# Patient Record
Sex: FEMALE | Race: WHITE | NOT HISPANIC OR LATINO | Employment: UNEMPLOYED | URBAN - METROPOLITAN AREA
[De-identification: names, ages, dates, MRNs, and addresses within clinical notes are randomized per-mention and may not be internally consistent; named-entity substitution may affect disease eponyms.]

---

## 2017-10-04 ENCOUNTER — CONVERSION ENCOUNTER (OUTPATIENT)
Dept: GENERAL RADIOLOGY | Facility: HOSPITAL | Age: 57
End: 2017-10-04

## 2018-03-08 ENCOUNTER — OFFICE VISIT CONVERTED (OUTPATIENT)
Dept: GASTROENTEROLOGY | Facility: CLINIC | Age: 58
End: 2018-03-08
Attending: PHYSICIAN ASSISTANT

## 2018-08-08 ENCOUNTER — HOSPITAL ENCOUNTER (EMERGENCY)
Facility: HOSPITAL | Age: 58
Discharge: HOME | End: 2018-08-08
Attending: EMERGENCY MEDICINE | Admitting: EMERGENCY MEDICINE

## 2018-08-08 VITALS
HEIGHT: 63 IN | TEMPERATURE: 98.4 F | RESPIRATION RATE: 16 BRPM | SYSTOLIC BLOOD PRESSURE: 109 MMHG | WEIGHT: 150 LBS | OXYGEN SATURATION: 92 % | DIASTOLIC BLOOD PRESSURE: 52 MMHG | HEART RATE: 66 BPM | BODY MASS INDEX: 26.58 KG/M2

## 2018-08-08 DIAGNOSIS — J18.9 COMMUNITY ACQUIRED PNEUMONIA, UNSPECIFIED LATERALITY: Primary | ICD-10-CM

## 2018-08-08 LAB
ALBUMIN SERPL-MCNC: 3.5 G/DL (ref 3.5–5.2)
ALBUMIN/GLOB SERPL: 1.2 G/DL
ALP SERPL-CCNC: 93 U/L (ref 39–117)
ALT SERPL W P-5'-P-CCNC: 13 U/L (ref 1–33)
ANION GAP SERPL CALCULATED.3IONS-SCNC: 9.5 MMOL/L
AST SERPL-CCNC: 11 U/L (ref 1–32)
BASOPHILS # BLD AUTO: 0.01 10*3/MM3 (ref 0–0.2)
BASOPHILS NFR BLD AUTO: 0.2 % (ref 0–1.5)
BILIRUB SERPL-MCNC: <0.2 MG/DL (ref 0.1–1.2)
BUN BLD-MCNC: 15 MG/DL (ref 8–23)
BUN/CREAT SERPL: 14.3 (ref 7–25)
CALCIUM SPEC-SCNC: 9.2 MG/DL (ref 8.6–10.5)
CHLORIDE SERPL-SCNC: 98 MMOL/L (ref 98–107)
CO2 SERPL-SCNC: 29.5 MMOL/L (ref 22–29)
CREAT BLD-MCNC: 1.05 MG/DL (ref 0.57–1)
D-LACTATE SERPL-SCNC: 0.8 MMOL/L (ref 0.5–2)
DEPRECATED RDW RBC AUTO: 40.2 FL (ref 37–54)
EOSINOPHIL # BLD AUTO: 0.14 10*3/MM3 (ref 0–0.7)
EOSINOPHIL NFR BLD AUTO: 2.3 % (ref 0.3–6.2)
ERYTHROCYTE [DISTWIDTH] IN BLOOD BY AUTOMATED COUNT: 13.4 % (ref 11.7–13)
GFR SERPL CREATININE-BSD FRML MDRD: 53 ML/MIN/1.73
GLOBULIN UR ELPH-MCNC: 2.9 GM/DL
GLUCOSE BLD-MCNC: 148 MG/DL (ref 65–99)
HCT VFR BLD AUTO: 38.3 % (ref 35.6–45.5)
HGB BLD-MCNC: 12.2 G/DL (ref 11.9–15.5)
IMM GRANULOCYTES # BLD: 0.01 10*3/MM3 (ref 0–0.03)
IMM GRANULOCYTES NFR BLD: 0.2 % (ref 0–0.5)
LYMPHOCYTES # BLD AUTO: 0.53 10*3/MM3 (ref 0.9–4.8)
LYMPHOCYTES NFR BLD AUTO: 8.5 % (ref 19.6–45.3)
MCH RBC QN AUTO: 26.1 PG (ref 26.9–32)
MCHC RBC AUTO-ENTMCNC: 31.9 G/DL (ref 32.4–36.3)
MCV RBC AUTO: 81.8 FL (ref 80.5–98.2)
MONOCYTES # BLD AUTO: 0.43 10*3/MM3 (ref 0.2–1.2)
MONOCYTES NFR BLD AUTO: 6.9 % (ref 5–12)
NEUTROPHILS # BLD AUTO: 5.1 10*3/MM3 (ref 1.9–8.1)
NEUTROPHILS NFR BLD AUTO: 82.1 % (ref 42.7–76)
NT-PROBNP SERPL-MCNC: 126.6 PG/ML (ref 0–900)
PLATELET # BLD AUTO: 340 10*3/MM3 (ref 140–500)
PMV BLD AUTO: 9.2 FL (ref 6–12)
POTASSIUM BLD-SCNC: 4.3 MMOL/L (ref 3.5–5.2)
PROT SERPL-MCNC: 6.4 G/DL (ref 6–8.5)
RBC # BLD AUTO: 4.68 10*6/MM3 (ref 3.9–5.2)
SODIUM BLD-SCNC: 137 MMOL/L (ref 136–145)
TROPONIN T SERPL-MCNC: <0.01 NG/ML (ref 0–0.03)
WBC NRBC COR # BLD: 6.21 10*3/MM3 (ref 4.5–10.7)

## 2018-08-08 PROCEDURE — 83605 ASSAY OF LACTIC ACID: CPT | Performed by: EMERGENCY MEDICINE

## 2018-08-08 PROCEDURE — 94640 AIRWAY INHALATION TREATMENT: CPT

## 2018-08-08 PROCEDURE — 80053 COMPREHEN METABOLIC PANEL: CPT | Performed by: EMERGENCY MEDICINE

## 2018-08-08 PROCEDURE — 84484 ASSAY OF TROPONIN QUANT: CPT | Performed by: EMERGENCY MEDICINE

## 2018-08-08 PROCEDURE — 93005 ELECTROCARDIOGRAM TRACING: CPT | Performed by: EMERGENCY MEDICINE

## 2018-08-08 PROCEDURE — 83880 ASSAY OF NATRIURETIC PEPTIDE: CPT | Performed by: EMERGENCY MEDICINE

## 2018-08-08 PROCEDURE — 99284 EMERGENCY DEPT VISIT MOD MDM: CPT

## 2018-08-08 PROCEDURE — 93010 ELECTROCARDIOGRAM REPORT: CPT | Performed by: INTERNAL MEDICINE

## 2018-08-08 PROCEDURE — 94799 UNLISTED PULMONARY SVC/PX: CPT

## 2018-08-08 PROCEDURE — 85025 COMPLETE CBC W/AUTO DIFF WBC: CPT | Performed by: EMERGENCY MEDICINE

## 2018-08-08 PROCEDURE — 36415 COLL VENOUS BLD VENIPUNCTURE: CPT

## 2018-08-08 PROCEDURE — 87040 BLOOD CULTURE FOR BACTERIA: CPT | Performed by: EMERGENCY MEDICINE

## 2018-08-08 RX ORDER — OXYCODONE HYDROCHLORIDE AND ACETAMINOPHEN 5; 325 MG/1; MG/1
2 TABLET ORAL ONCE
Status: COMPLETED | OUTPATIENT
Start: 2018-08-08 | End: 2018-08-08

## 2018-08-08 RX ORDER — OXYCODONE HYDROCHLORIDE AND ACETAMINOPHEN 5; 325 MG/1; MG/1
1-2 TABLET ORAL EVERY 6 HOURS PRN
Qty: 15 TABLET | Refills: 0 | Status: SHIPPED | OUTPATIENT
Start: 2018-08-08 | End: 2022-04-01

## 2018-08-08 RX ORDER — SODIUM CHLORIDE 0.9 % (FLUSH) 0.9 %
10 SYRINGE (ML) INJECTION AS NEEDED
Status: DISCONTINUED | OUTPATIENT
Start: 2018-08-08 | End: 2018-08-09 | Stop reason: HOSPADM

## 2018-08-08 RX ORDER — ESCITALOPRAM OXALATE 10 MG/1
10 TABLET ORAL DAILY
COMMUNITY
End: 2022-02-25

## 2018-08-08 RX ORDER — LEVOTHYROXINE SODIUM 88 UG/1
88 TABLET ORAL DAILY
COMMUNITY
End: 2022-02-25

## 2018-08-08 RX ORDER — ALBUTEROL SULFATE 90 UG/1
2 AEROSOL, METERED RESPIRATORY (INHALATION) EVERY 4 HOURS PRN
Qty: 1 INHALER | Refills: 0 | Status: SHIPPED | OUTPATIENT
Start: 2018-08-08 | End: 2022-04-01

## 2018-08-08 RX ORDER — PROMETHAZINE HYDROCHLORIDE 25 MG/1
25 TABLET ORAL EVERY 6 HOURS PRN
Qty: 10 TABLET | Refills: 0 | Status: SHIPPED | OUTPATIENT
Start: 2018-08-08 | End: 2022-04-01

## 2018-08-08 RX ORDER — IPRATROPIUM BROMIDE AND ALBUTEROL SULFATE 2.5; .5 MG/3ML; MG/3ML
3 SOLUTION RESPIRATORY (INHALATION) ONCE
Status: COMPLETED | OUTPATIENT
Start: 2018-08-08 | End: 2018-08-08

## 2018-08-08 RX ORDER — HYDROCODONE BITARTRATE AND ACETAMINOPHEN 5; 325 MG/1; MG/1
1 TABLET ORAL EVERY 6 HOURS PRN
COMMUNITY
End: 2018-08-08

## 2018-08-08 RX ADMIN — OXYCODONE HYDROCHLORIDE AND ACETAMINOPHEN 2 TABLET: 5; 325 TABLET ORAL at 21:59

## 2018-08-08 RX ADMIN — IPRATROPIUM BROMIDE AND ALBUTEROL SULFATE 3 ML: .5; 3 SOLUTION RESPIRATORY (INHALATION) at 22:01

## 2018-08-08 NOTE — ED PROVIDER NOTES
" EMERGENCY DEPARTMENT ENCOUNTER    CHIEF COMPLAINT  Chief Complaint: chest pain  History given by: pt  History limited by: none  Room Number:   PMD: Baltazar Davis MD      HPI:  Pt is a 58 y.o. female who presents complaining of \"sharp\" intermittent right sided chest pain that began 5 days ago. Pt denies any trauma to the chest. Pt reports the pain is mild at rest but becomes moderate while breathing. Pt admits to fever (max 102), SOA on exertion, and RUQ pain. Pt denies history of blood clots or heart problems. Pt was seen at PCP and was diagnosed with pneumonia (CT chest showed no PE, +PNA). Pt also had an elevated D- Dimer and was told to come to ER due to worry for CHF. Pt was told to come to the ER. Pt was put on antibiotics yesterday and the pt has taken two doses.    Duration/Onset/Timin days  Location: right sided chest  Radiation: none  Quality: \"sharp\"  Intensity/Severity: mild at rest  Associated Symptoms: SOA, RUQ pain, fever (max 102)  Aggravating or Alleviating Factors: Worsened while breathing  Previous Episodes: none      PAST MEDICAL HISTORY  Active Ambulatory Problems     Diagnosis Date Noted   • No Active Ambulatory Problems     Resolved Ambulatory Problems     Diagnosis Date Noted   • No Resolved Ambulatory Problems     Past Medical History:   Diagnosis Date   • Hypothyroidism        PAST SURGICAL HISTORY  History reviewed. No pertinent surgical history.    FAMILY HISTORY  History reviewed. No pertinent family history.    SOCIAL HISTORY  Social History     Social History   • Marital status:      Spouse name: N/A   • Number of children: N/A   • Years of education: N/A     Occupational History   • Not on file.     Social History Main Topics   • Smoking status: Never Smoker   • Smokeless tobacco: Not on file   • Alcohol use No   • Drug use: Unknown   • Sexual activity: Not on file     Other Topics Concern   • Not on file     Social History Narrative   • No narrative on file "       ALLERGIES  Latex    REVIEW OF SYSTEMS  Review of Systems   Constitutional: Positive for fever (max 102). Negative for chills.   HENT: Negative.  Negative for sore throat.    Eyes: Negative.    Respiratory: Positive for shortness of breath (on exertion). Negative for cough.    Cardiovascular: Positive for chest pain (right sided). Negative for leg swelling.   Gastrointestinal: Positive for abdominal pain (RUQ). Negative for diarrhea and vomiting.   Genitourinary: Negative.  Negative for dysuria.   Musculoskeletal: Negative.  Negative for back pain.   Skin: Negative.  Negative for rash.   Neurological: Negative.  Negative for headaches.   All other systems reviewed and are negative.      PHYSICAL EXAM  ED Triage Vitals [08/08/18 1844]   Temp Heart Rate Resp BP SpO2   100.2 °F (37.9 °C) 105 18 -- 94 %      Temp src Heart Rate Source Patient Position BP Location FiO2 (%)   -- -- -- -- --       Physical Exam   Constitutional: No distress.   HENT:   Head: Normocephalic and atraumatic.   Mouth/Throat: Oropharynx is clear and moist.   Eyes:   Unremarkable   Cardiovascular: Normal rate and regular rhythm.    Pulmonary/Chest: No respiratory distress. She has decreased breath sounds in the right lower field. She has rales in the right lower field.   Oxygen at 94%   Abdominal: There is no tenderness.   Musculoskeletal: She exhibits no edema or tenderness.   Neurological: She is alert.   Skin: No rash noted.   Nursing note and vitals reviewed.      LAB RESULTS  Lab Results (last 24 hours)     Procedure Component Value Units Date/Time    CBC & Differential [[id number]] Collected:  08/08/18 1951    Specimen:  Blood Updated:  08/08/18 2004    Narrative:       The following orders were created for panel order CBC & Differential.  Procedure                               Abnormality         Status                     ---------                               -----------         ------                     CBC Auto  Differential[[id number]]        Abnormal            Final result                 Please view results for these tests on the individual orders.    Comprehensive Metabolic Panel [[id number]]  (Abnormal) Collected:  08/08/18 1951    Specimen:  Blood Updated:  08/08/18 2030     Glucose 148 (H) mg/dL      BUN 15 mg/dL      Creatinine 1.05 (H) mg/dL      Sodium 137 mmol/L      Potassium 4.3 mmol/L      Chloride 98 mmol/L      CO2 29.5 (H) mmol/L      Calcium 9.2 mg/dL      Total Protein 6.4 g/dL      Albumin 3.50 g/dL      ALT (SGPT) 13 U/L      AST (SGOT) 11 U/L      Alkaline Phosphatase 93 U/L      Total Bilirubin <0.2 mg/dL      eGFR Non African Amer 53 (L) mL/min/1.73      Globulin 2.9 gm/dL      A/G Ratio 1.2 g/dL      BUN/Creatinine Ratio 14.3     Anion Gap 9.5 mmol/L     BNP [[id number]]  (Normal) Collected:  08/08/18 1951    Specimen:  Blood Updated:  08/08/18 2024     proBNP 126.6 pg/mL     Narrative:       Among patients with dyspnea, NT-proBNP is highly sensitive for the detection of acute congestive heart failure. In addition NT-proBNP of <300 pg/ml effectively rules out acute congestive heart failure with 99% negative predictive value.    Troponin [[id number]]  (Normal) Collected:  08/08/18 1951    Specimen:  Blood Updated:  08/08/18 2026     Troponin T <0.010 ng/mL     Narrative:       Troponin T Reference Ranges:  Less than 0.03 ng/mL:    Negative for AMI  0.03 to 0.09 ng/mL:      Indeterminant for AMI  Greater than 0.09 ng/mL: Positive for AMI    Blood Culture - Blood, [[id number]] Collected:  08/08/18 1951    Specimen:  Blood from Arm, Right Updated:  08/08/18 2003    Lactic Acid, Plasma [[id number]]  (Normal) Collected:  08/08/18 1951    Specimen:  Blood Updated:  08/08/18 2016     Lactate 0.8 mmol/L     CBC Auto Differential [[id number]]  (Abnormal) Collected:  08/08/18 1951    Specimen:  Blood Updated:  08/08/18 2004     WBC 6.21 10*3/mm3      RBC 4.68 10*6/mm3      Hemoglobin 12.2 g/dL      Hematocrit 38.3 %       MCV 81.8 fL      MCH 26.1 (L) pg      MCHC 31.9 (L) g/dL      RDW 13.4 (H) %      RDW-SD 40.2 fl      MPV 9.2 fL      Platelets 340 10*3/mm3      Neutrophil % 82.1 (H) %      Lymphocyte % 8.5 (L) %      Monocyte % 6.9 %      Eosinophil % 2.3 %      Basophil % 0.2 %      Immature Grans % 0.2 %      Neutrophils, Absolute 5.10 10*3/mm3      Lymphocytes, Absolute 0.53 (L) 10*3/mm3      Monocytes, Absolute 0.43 10*3/mm3      Eosinophils, Absolute 0.14 10*3/mm3      Basophils, Absolute 0.01 10*3/mm3      Immature Grans, Absolute 0.01 10*3/mm3     Blood Culture - Blood, [[id number]] Collected:  08/08/18 1956    Specimen:  Blood from Arm, Left Updated:  08/08/18 2002          I ordered the above labs and reviewed the results      PROCEDURES  Procedures    EKG           EKG time: 1848  Rhythm/Rate: sinus 94  P waves and NC: normal  QRS, axis: indeterminate axis  ST and T waves: T wave inversion in III, flattened T waves laterally     Interpreted Contemporaneously by me, independently viewed  No prior to compare    PROGRESS AND CONSULTS        1845  Ordered EKG for further evaluation.    1909  Discussed option to admit the pt due to pneumonia. Pt understands and agrees with the plan. All questions have been answered.    1919  Ordered labs for further evaluation.    2113  Rechecked patient who is resting comfortably. Pt stats are 91% on room air at rest. Discussed all lab and test results. Discussed plan to admit the pt due to pneumonia and low oxygen level. Pt understands and agrees with the plan. All questions have been answered.    2119  Ordered a call from [facility].    2126  Discussed case with Dr Terrell, A  Reviewed history, exam, results and treatments.  Discussed concerns and plan of care. Dr Terrell recommends attempting to ambulate the pt and view what her oxygen stats do during ambulation.     2136  Nurse reports that during movement pt stats dropped to 88 but once she was back at rest the stats went back up to  92. Gave pt option to admit or go home. Pt decided to go home. Pt reports she was given Norco but the Norco made her very nauseated and didn't giver her any relief. Pt reports she is unable to sleep due to pain. Discussed plan to discharge the pt with pain medication to help her sleep. Also discussed that the pt should follow up with PCP in the next few days to have a repeat Chest XR done. Discussed that the pt should try to rest as much as possible and drink plenty of fluids. Pt understands and agrees with the plan. All questions have been answered.    2150  Ordered Duo-neb for SOA and percocet for pain.    2237  Rechecked patient who is resting and reports feeling much better after percocet. Discussed plan to discharge the pt with percocet for pain. Pt understands and agrees with the plan. All questions have been answered.      MEDICAL DECISION MAKING  Results were reviewed/discussed with the patient and they were also made aware of online access. Pt also made aware that some labs, such as cultures, will not be resulted during ER visit and follow up with PMD is necessary.     MDM  Number of Diagnoses or Management Options     Amount and/or Complexity of Data Reviewed  Clinical lab tests: reviewed and ordered (Troponin  <0.010)  Tests in the medicine section of CPT®: ordered and reviewed (Refer to the procedure section of the note for EKG results)  Discuss the patient with other providers: yes (Dr Terrell)           DIAGNOSIS  Final diagnoses:   Community acquired pneumonia, unspecified laterality       DISPOSITION  DISCHARGE    Patient discharged in stable condition.    Reviewed implications of results, diagnosis, meds, responsibility to follow up, warning signs and symptoms of possible worsening, potential complications and reasons to return to ER.    Patient/Family voiced understanding of above instructions.    Discussed plan for discharge, as there is no emergent indication for admission. Patient referred to  primary care provider for BP management due to today's BP. Pt/family is agreeable and understands need for follow up and repeat testing.  Pt is aware that discharge does not mean that nothing is wrong but it indicates no emergency is present that requires admission and they must continue care with follow-up as given below or physician of their choice.     FOLLOW-UP  No follow-up provider specified.       Medication List      Stop    HYDROcodone-acetaminophen 5-325 MG per tablet  Commonly known as:  NORCO                  Latest Documented Vital Signs:  As of 10:39 PM  BP- 123/69 HR- 66 Temp- 100.2 °F (37.9 °C) O2 sat- 99%    --  Documentation assistance provided by chaim Gutiérrez for Dr Nation.  Information recorded by the scribe was done at my direction and has been verified and validated by me.           Chitra Gutiérrez  08/08/18 3080       Richard Nation MD  08/08/18 3656

## 2018-08-08 NOTE — ED NOTES
Pt reports that she was seen in PCP in [place] yesterday and was told she had double pneumonia; then had CT done and said she was in CHF; c/o chest pain with breathing and a hard time lying down due to the pain.      Carmen Chan, SUNITA  08/08/18 4397

## 2018-08-13 LAB
BACTERIA SPEC AEROBE CULT: NORMAL
BACTERIA SPEC AEROBE CULT: NORMAL

## 2018-09-11 ENCOUNTER — OFFICE VISIT CONVERTED (OUTPATIENT)
Dept: PULMONOLOGY | Facility: CLINIC | Age: 58
End: 2018-09-11
Attending: INTERNAL MEDICINE

## 2018-09-17 ENCOUNTER — OFFICE VISIT CONVERTED (OUTPATIENT)
Dept: PULMONOLOGY | Facility: CLINIC | Age: 58
End: 2018-09-17
Attending: PHYSICIAN ASSISTANT

## 2018-09-25 ENCOUNTER — OFFICE VISIT CONVERTED (OUTPATIENT)
Dept: PULMONOLOGY | Facility: CLINIC | Age: 58
End: 2018-09-25
Attending: INTERNAL MEDICINE

## 2018-10-18 ENCOUNTER — OFFICE VISIT CONVERTED (OUTPATIENT)
Dept: PULMONOLOGY | Facility: CLINIC | Age: 58
End: 2018-10-18
Attending: PHYSICIAN ASSISTANT

## 2019-01-25 ENCOUNTER — HOSPITAL ENCOUNTER (OUTPATIENT)
Dept: LAB | Facility: HOSPITAL | Age: 59
Discharge: HOME | End: 2019-01-25
Attending: INTERNAL MEDICINE

## 2019-01-25 LAB
25(OH)D3 SERPL-MCNC: 29.9 NG/ML (ref 30–100)
ALBUMIN SERPL-MCNC: 4 G/DL (ref 3.5–5)
ALBUMIN/GLOB SERPL: 1.7 {RATIO} (ref 1.4–2.6)
ALP SERPL-CCNC: 84 U/L (ref 53–141)
ALT SERPL-CCNC: 21 U/L (ref 10–40)
ANION GAP SERPL CALC-SCNC: 14 MMOL/L (ref 8–19)
AST SERPL-CCNC: 22 U/L (ref 15–50)
BASOPHILS # BLD AUTO: 0.03 10*3/UL (ref 0–0.2)
BASOPHILS NFR BLD AUTO: 0.95 % (ref 0–3)
BILIRUB SERPL-MCNC: 0.43 MG/DL (ref 0.2–1.3)
BUN SERPL-MCNC: 18 MG/DL (ref 5–25)
BUN/CREAT SERPL: 20 {RATIO} (ref 6–20)
CALCIUM SERPL-MCNC: 9.3 MG/DL (ref 8.7–10.4)
CHLORIDE SERPL-SCNC: 101 MMOL/L (ref 99–111)
CHOLEST SERPL-MCNC: 247 MG/DL (ref 107–200)
CHOLEST/HDLC SERPL: 4.3 {RATIO} (ref 3–6)
CONV CO2: 28 MMOL/L (ref 22–32)
CONV TOTAL PROTEIN: 6.3 G/DL (ref 6.3–8.2)
CREAT UR-MCNC: 0.88 MG/DL (ref 0.5–0.9)
EOSINOPHIL # BLD AUTO: 0.01 10*3/UL (ref 0–0.7)
EOSINOPHIL # BLD AUTO: 0.51 % (ref 0–7)
ERYTHROCYTE [DISTWIDTH] IN BLOOD BY AUTOMATED COUNT: 12.6 % (ref 11.5–14.5)
GFR SERPLBLD BASED ON 1.73 SQ M-ARVRAT: >60 ML/MIN/{1.73_M2}
GLOBULIN UR ELPH-MCNC: 2.3 G/DL (ref 2–3.5)
GLUCOSE SERPL-MCNC: 96 MG/DL (ref 65–99)
HBA1C MFR BLD: 12.9 G/DL (ref 12–16)
HCT VFR BLD AUTO: 38.7 % (ref 37–47)
HDLC SERPL-MCNC: 58 MG/DL (ref 40–60)
LDLC SERPL CALC-MCNC: 177 MG/DL (ref 70–100)
LYMPHOCYTES # BLD AUTO: 0.71 10*3/UL (ref 1–5)
MCH RBC QN AUTO: 26.2 PG (ref 27–31)
MCHC RBC AUTO-ENTMCNC: 33.4 G/DL (ref 33–37)
MCV RBC AUTO: 78.5 FL (ref 81–99)
MONOCYTES # BLD AUTO: 0.2 10*3/UL (ref 0.2–1.2)
MONOCYTES NFR BLD AUTO: 7.6 % (ref 3–10)
NEUTROPHILS # BLD AUTO: 1.69 10*3/UL (ref 2–8)
NEUTROPHILS NFR BLD AUTO: 64 % (ref 30–85)
NRBC BLD AUTO-RTO: 0 % (ref 0–0.01)
OSMOLALITY SERPL CALC.SUM OF ELEC: 290 MOSM/KG (ref 273–304)
PLATELET # BLD AUTO: 192 10*3/UL (ref 130–400)
PMV BLD AUTO: 7.9 FL (ref 7.4–10.4)
POTASSIUM SERPL-SCNC: 4.4 MMOL/L (ref 3.5–5.3)
RBC # BLD AUTO: 4.93 10*6/UL (ref 4.2–5.4)
SODIUM SERPL-SCNC: 139 MMOL/L (ref 135–147)
TRIGL SERPL-MCNC: 62 MG/DL (ref 40–150)
TSH SERPL-ACNC: 0.4 M[IU]/L (ref 0.27–4.2)
VARIANT LYMPHS NFR BLD MANUAL: 27 % (ref 20–45)
VLDLC SERPL-MCNC: 12 MG/DL (ref 5–37)
WBC # BLD AUTO: 2.64 10*3/UL (ref 4.8–10.8)

## 2019-02-07 ENCOUNTER — HOSPITAL ENCOUNTER (OUTPATIENT)
Dept: LAB | Facility: HOSPITAL | Age: 59
Discharge: HOME | End: 2019-02-07
Attending: INTERNAL MEDICINE

## 2019-05-07 ENCOUNTER — HOSPITAL ENCOUNTER (OUTPATIENT)
Dept: LAB | Facility: HOSPITAL | Age: 59
Discharge: HOME | End: 2019-05-07
Attending: INTERNAL MEDICINE

## 2019-05-07 LAB
BASOPHILS # BLD AUTO: 0.02 10*3/UL (ref 0–0.2)
BASOPHILS NFR BLD AUTO: 0.6 % (ref 0–3)
CONV ABS IMM GRAN: 0 10*3/UL (ref 0–0.2)
CONV IMMATURE GRAN: 0 % (ref 0–1.8)
DEPRECATED RDW RBC AUTO: 47.7 FL (ref 36.4–46.3)
EOSINOPHIL # BLD AUTO: 0 % (ref 0–7)
EOSINOPHIL # BLD AUTO: 0 10*3/UL (ref 0–0.7)
ERYTHROCYTE [DISTWIDTH] IN BLOOD BY AUTOMATED COUNT: 15.6 % (ref 11.7–14.4)
FOLATE SERPL-MCNC: 11 NG/ML (ref 4.8–20)
HBA1C MFR BLD: 12.5 G/DL (ref 12–16)
HCT VFR BLD AUTO: 40.5 % (ref 37–47)
LYMPHOCYTES # BLD AUTO: 1.14 10*3/UL (ref 1–5)
MCH RBC QN AUTO: 25.5 PG (ref 27–31)
MCHC RBC AUTO-ENTMCNC: 30.9 G/DL (ref 33–37)
MCV RBC AUTO: 82.7 FL (ref 81–99)
MONOCYTES # BLD AUTO: 0.22 10*3/UL (ref 0.2–1.2)
MONOCYTES NFR BLD AUTO: 6.4 % (ref 3–10)
NEUTROPHILS # BLD AUTO: 2.04 10*3/UL (ref 2–8)
NEUTROPHILS NFR BLD AUTO: 59.7 % (ref 30–85)
NRBC CBCN: 0 % (ref 0–0.7)
PLATELET # BLD AUTO: 248 10*3/UL (ref 130–400)
PMV BLD AUTO: 10.7 FL (ref 9.4–12.3)
RBC # BLD AUTO: 4.9 10*6/UL (ref 4.2–5.4)
TSH SERPL-ACNC: 0.35 M[IU]/L (ref 0.27–4.2)
VARIANT LYMPHS NFR BLD MANUAL: 33.3 % (ref 20–45)
VIT B12 SERPL-MCNC: 708 PG/ML (ref 211–911)
WBC # BLD AUTO: 3.42 10*3/UL (ref 4.8–10.8)

## 2019-07-25 ENCOUNTER — HOSPITAL ENCOUNTER (OUTPATIENT)
Dept: OTHER | Facility: HOSPITAL | Age: 59
Discharge: HOME | End: 2019-07-25
Attending: INTERNAL MEDICINE

## 2019-07-25 LAB
ANION GAP SERPL CALC-SCNC: 20 MMOL/L (ref 8–19)
BASOPHILS # BLD AUTO: 0.02 10*3/UL (ref 0–0.2)
BASOPHILS NFR BLD AUTO: 0.5 % (ref 0–3)
BNP SERPL-MCNC: 674 PG/ML (ref 0–900)
BUN SERPL-MCNC: 22 MG/DL (ref 5–25)
BUN/CREAT SERPL: 15 {RATIO} (ref 6–20)
CALCIUM SERPL-MCNC: 9.2 MG/DL (ref 8.7–10.4)
CHLORIDE SERPL-SCNC: 100 MMOL/L (ref 99–111)
CONV ABS IMM GRAN: 0.01 10*3/UL (ref 0–0.2)
CONV CO2: 25 MMOL/L (ref 22–32)
CONV IMMATURE GRAN: 0.2 % (ref 0–1.8)
CREAT UR-MCNC: 1.51 MG/DL (ref 0.5–0.9)
D-LACTATE SERPL-SCNC: 0.8 MMOL/L (ref 0.7–2.1)
DEPRECATED RDW RBC AUTO: 40.4 FL (ref 36.4–46.3)
EOSINOPHIL # BLD AUTO: 0.5 10*3/UL (ref 0–0.7)
EOSINOPHIL # BLD AUTO: 11.6 % (ref 0–7)
ERYTHROCYTE [DISTWIDTH] IN BLOOD BY AUTOMATED COUNT: 13.7 % (ref 11.7–14.4)
GFR SERPLBLD BASED ON 1.73 SQ M-ARVRAT: 37 ML/MIN/{1.73_M2}
GLUCOSE SERPL-MCNC: 111 MG/DL (ref 65–99)
HBA1C MFR BLD: 12.6 G/DL (ref 12–16)
HCT VFR BLD AUTO: 39.4 % (ref 37–47)
LYMPHOCYTES # BLD AUTO: 0.63 10*3/UL (ref 1–5)
MCH RBC QN AUTO: 26.3 PG (ref 27–31)
MCHC RBC AUTO-ENTMCNC: 32 G/DL (ref 33–37)
MCV RBC AUTO: 82.1 FL (ref 81–99)
MONOCYTES # BLD AUTO: 0.23 10*3/UL (ref 0.2–1.2)
MONOCYTES NFR BLD AUTO: 5.3 % (ref 3–10)
NEUTROPHILS # BLD AUTO: 2.92 10*3/UL (ref 2–8)
NEUTROPHILS NFR BLD AUTO: 67.8 % (ref 30–85)
NRBC CBCN: 0 % (ref 0–0.7)
OSMOLALITY SERPL CALC.SUM OF ELEC: 294 MOSM/KG (ref 273–304)
PLATELET # BLD AUTO: 190 10*3/UL (ref 130–400)
PMV BLD AUTO: 10.2 FL (ref 9.4–12.3)
POTASSIUM SERPL-SCNC: 4.7 MMOL/L (ref 3.5–5.3)
PROCALCITONIN SERPL-MCNC: 0.1 NG/ML (ref 0–4)
RBC # BLD AUTO: 4.8 10*6/UL (ref 4.2–5.4)
SODIUM SERPL-SCNC: 140 MMOL/L (ref 135–147)
VARIANT LYMPHS NFR BLD MANUAL: 14.6 % (ref 20–45)
WBC # BLD AUTO: 4.31 10*3/UL (ref 4.8–10.8)

## 2019-07-26 ENCOUNTER — HOSPITAL ENCOUNTER (OUTPATIENT)
Dept: OTHER | Facility: HOSPITAL | Age: 59
Discharge: HOME | End: 2019-07-26
Attending: INTERNAL MEDICINE

## 2019-07-26 LAB
APPEARANCE UR: CLEAR
BILIRUB UR QL: NEGATIVE
COLOR UR: YELLOW
CONV COLLECTION SOURCE (UA): ABNORMAL
CONV UROBILINOGEN IN URINE BY AUTOMATED TEST STRIP: 0.2 {EHRLICHU}/DL (ref 0.1–1)
GLUCOSE UR QL: NEGATIVE MG/DL
HGB UR QL STRIP: ABNORMAL
KETONES UR QL STRIP: NEGATIVE MG/DL
LEUKOCYTE ESTERASE UR QL STRIP: NEGATIVE
M PNEUMO IGM SER QL: NEGATIVE
NITRITE UR QL STRIP: NEGATIVE
PH UR STRIP.AUTO: 5 [PH] (ref 5–8)
PROT UR QL: NEGATIVE MG/DL
RBC #/AREA URNS HPF: ABNORMAL /[HPF]
SP GR UR: 1.01 (ref 1–1.03)
WBC #/AREA URNS HPF: ABNORMAL /[HPF]

## 2019-07-27 LAB — B BURGDOR IGG+IGM SER-ACNC: <0.91 ISR (ref 0–0.9)

## 2019-07-29 LAB
AMPICILLIN SUSC ISLT: <=0.25
BACTERIA UR CULT: ABNORMAL
CEFOTAXIME SUSC ISLT: <=0.12
CEFTRIAXONE SUSC ISLT: <=0.12
IGE SERPL-ACNC: 32 K[IU]/ML (ref 0–24)
LEVOFLOXACIN SUSC ISLT: 1
PENICILLIN G SUSC ISLT: 0.12
TETRACYCLINE SUSC ISLT: >=16
VANCOMYCIN SUSC ISLT: 0.5

## 2019-07-30 ENCOUNTER — HOSPITAL ENCOUNTER (OUTPATIENT)
Dept: LAB | Facility: HOSPITAL | Age: 59
Discharge: HOME | End: 2019-07-30
Attending: INTERNAL MEDICINE

## 2019-07-30 LAB
ANION GAP SERPL CALC-SCNC: 18 MMOL/L (ref 8–19)
B MICROTI DNA BLD QL NAA+PROBE: NOT DETECTED
BUN SERPL-MCNC: 17 MG/DL (ref 5–25)
BUN/CREAT SERPL: 20 {RATIO} (ref 6–20)
CALCIUM SERPL-MCNC: 9 MG/DL (ref 8.7–10.4)
CHLORIDE SERPL-SCNC: 100 MMOL/L (ref 99–111)
CONV ANAPLASMA PHAGOCYTOPHILUM PCR: NOT DETECTED
CONV BABESIA SPECIES PCR: NOT DETECTED
CONV CO2: 27 MMOL/L (ref 22–32)
CONV EHRLICHIA EWINGII CANIS PCR: NOT DETECTED
CONV EHRLICHIA MURIS LIKE PCR: NOT DETECTED
CREAT UR-MCNC: 0.84 MG/DL (ref 0.5–0.9)
E CHAFFEENSIS DNA BLD QL NAA+PROBE: NOT DETECTED
GFR SERPLBLD BASED ON 1.73 SQ M-ARVRAT: >60 ML/MIN/{1.73_M2}
GLUCOSE SERPL-MCNC: 96 MG/DL (ref 65–99)
OSMOLALITY SERPL CALC.SUM OF ELEC: 293 MOSM/KG (ref 273–304)
POTASSIUM SERPL-SCNC: 4.1 MMOL/L (ref 3.5–5.3)
SODIUM SERPL-SCNC: 141 MMOL/L (ref 135–147)

## 2019-08-05 ENCOUNTER — HOSPITAL ENCOUNTER (OUTPATIENT)
Dept: GENERAL RADIOLOGY | Facility: HOSPITAL | Age: 59
Discharge: HOME | End: 2019-08-05
Attending: INTERNAL MEDICINE

## 2019-08-09 ENCOUNTER — OFFICE VISIT CONVERTED (OUTPATIENT)
Dept: PULMONOLOGY | Facility: CLINIC | Age: 59
End: 2019-08-09
Attending: PHYSICIAN ASSISTANT

## 2019-08-28 ENCOUNTER — OFFICE VISIT CONVERTED (OUTPATIENT)
Dept: ORTHOPEDIC SURGERY | Facility: CLINIC | Age: 59
End: 2019-08-28
Attending: ORTHOPAEDIC SURGERY

## 2019-09-18 ENCOUNTER — OFFICE VISIT CONVERTED (OUTPATIENT)
Dept: ORTHOPEDIC SURGERY | Facility: CLINIC | Age: 59
End: 2019-09-18
Attending: ORTHOPAEDIC SURGERY

## 2019-10-14 ENCOUNTER — OFFICE VISIT CONVERTED (OUTPATIENT)
Dept: ORTHOPEDIC SURGERY | Facility: CLINIC | Age: 59
End: 2019-10-14
Attending: ORTHOPAEDIC SURGERY

## 2019-11-05 ENCOUNTER — HOSPITAL ENCOUNTER (OUTPATIENT)
Dept: LAB | Facility: HOSPITAL | Age: 59
Discharge: HOME | End: 2019-11-05
Attending: INTERNAL MEDICINE

## 2019-11-05 LAB
25(OH)D3 SERPL-MCNC: 27.4 NG/ML (ref 30–100)
ALBUMIN SERPL-MCNC: 4 G/DL (ref 3.5–5)
ALBUMIN/GLOB SERPL: 1.4 {RATIO} (ref 1.4–2.6)
ALP SERPL-CCNC: 110 U/L (ref 43–160)
ALT SERPL-CCNC: 12 U/L (ref 10–40)
ANION GAP SERPL CALC-SCNC: 16 MMOL/L (ref 8–19)
AST SERPL-CCNC: 16 U/L (ref 15–50)
BASOPHILS # BLD AUTO: 0.02 10*3/UL (ref 0–0.2)
BASOPHILS NFR BLD AUTO: 0.4 % (ref 0–3)
BILIRUB SERPL-MCNC: 0.26 MG/DL (ref 0.2–1.3)
BUN SERPL-MCNC: 19 MG/DL (ref 5–25)
BUN/CREAT SERPL: 25 {RATIO} (ref 6–20)
CALCIUM SERPL-MCNC: 9.5 MG/DL (ref 8.7–10.4)
CHLORIDE SERPL-SCNC: 98 MMOL/L (ref 99–111)
CONV ABS IMM GRAN: 0.01 10*3/UL (ref 0–0.2)
CONV CO2: 27 MMOL/L (ref 22–32)
CONV IMMATURE GRAN: 0.2 % (ref 0–1.8)
CONV TOTAL PROTEIN: 6.8 G/DL (ref 6.3–8.2)
CREAT UR-MCNC: 0.75 MG/DL (ref 0.5–0.9)
DEPRECATED RDW RBC AUTO: 39.6 FL (ref 36.4–46.3)
EOSINOPHIL # BLD AUTO: 0.28 10*3/UL (ref 0–0.7)
EOSINOPHIL # BLD AUTO: 6 % (ref 0–7)
ERYTHROCYTE [DISTWIDTH] IN BLOOD BY AUTOMATED COUNT: 13.1 % (ref 11.7–14.4)
GFR SERPLBLD BASED ON 1.73 SQ M-ARVRAT: >60 ML/MIN/{1.73_M2}
GLOBULIN UR ELPH-MCNC: 2.8 G/DL (ref 2–3.5)
GLUCOSE SERPL-MCNC: 82 MG/DL (ref 65–99)
HCT VFR BLD AUTO: 41 % (ref 37–47)
HGB BLD-MCNC: 12.6 G/DL (ref 12–16)
LYMPHOCYTES # BLD AUTO: 0.9 10*3/UL (ref 1–5)
LYMPHOCYTES NFR BLD AUTO: 19.1 % (ref 20–45)
MCH RBC QN AUTO: 25.6 PG (ref 27–31)
MCHC RBC AUTO-ENTMCNC: 30.7 G/DL (ref 33–37)
MCV RBC AUTO: 83.2 FL (ref 81–99)
MONOCYTES # BLD AUTO: 0.28 10*3/UL (ref 0.2–1.2)
MONOCYTES NFR BLD AUTO: 6 % (ref 3–10)
NEUTROPHILS # BLD AUTO: 3.21 10*3/UL (ref 2–8)
NEUTROPHILS NFR BLD AUTO: 68.3 % (ref 30–85)
NRBC CBCN: 0 % (ref 0–0.7)
OSMOLALITY SERPL CALC.SUM OF ELEC: 285 MOSM/KG (ref 273–304)
PLATELET # BLD AUTO: 390 10*3/UL (ref 130–400)
PMV BLD AUTO: 10 FL (ref 9.4–12.3)
POTASSIUM SERPL-SCNC: 4.1 MMOL/L (ref 3.5–5.3)
RBC # BLD AUTO: 4.93 10*6/UL (ref 4.2–5.4)
SODIUM SERPL-SCNC: 137 MMOL/L (ref 135–147)
TSH SERPL-ACNC: 0.49 M[IU]/L (ref 0.27–4.2)
WBC # BLD AUTO: 4.7 10*3/UL (ref 4.8–10.8)

## 2019-11-07 LAB — IGE SERPL-ACNC: 33 K[IU]/ML (ref 0–24)

## 2019-11-11 ENCOUNTER — HOSPITAL ENCOUNTER (OUTPATIENT)
Dept: GENERAL RADIOLOGY | Facility: HOSPITAL | Age: 59
Discharge: HOME | End: 2019-11-11
Attending: PHYSICIAN ASSISTANT

## 2019-11-11 ENCOUNTER — OFFICE VISIT CONVERTED (OUTPATIENT)
Dept: PULMONOLOGY | Facility: CLINIC | Age: 59
End: 2019-11-11
Attending: PHYSICIAN ASSISTANT

## 2019-11-18 ENCOUNTER — HOSPITAL ENCOUNTER (OUTPATIENT)
Dept: GENERAL RADIOLOGY | Facility: HOSPITAL | Age: 59
Discharge: HOME | End: 2019-11-18
Attending: PHYSICIAN ASSISTANT

## 2019-11-26 ENCOUNTER — OFFICE VISIT CONVERTED (OUTPATIENT)
Dept: PULMONOLOGY | Facility: CLINIC | Age: 59
End: 2019-11-26
Attending: PHYSICIAN ASSISTANT

## 2019-12-11 ENCOUNTER — HOSPITAL ENCOUNTER (OUTPATIENT)
Dept: OTHER | Facility: HOSPITAL | Age: 59
Discharge: HOME | End: 2019-12-11
Attending: INTERNAL MEDICINE

## 2019-12-11 LAB
BASOPHILS # BLD AUTO: 0.02 10*3/UL (ref 0–0.2)
BASOPHILS NFR BLD AUTO: 0.5 % (ref 0–3)
CONV ABS IMM GRAN: 0.01 10*3/UL (ref 0–0.2)
CONV IMMATURE GRAN: 0.2 % (ref 0–1.8)
CRP SERPL-MCNC: 138.8 MG/L (ref 0–5)
D-LACTATE SERPL-SCNC: 0.8 MMOL/L (ref 0.7–2.1)
DEPRECATED RDW RBC AUTO: 38.5 FL (ref 36.4–46.3)
EOSINOPHIL # BLD AUTO: 0.29 10*3/UL (ref 0–0.7)
EOSINOPHIL # BLD AUTO: 7 % (ref 0–7)
ERYTHROCYTE [DISTWIDTH] IN BLOOD BY AUTOMATED COUNT: 13.1 % (ref 11.7–14.4)
ERYTHROCYTE [SEDIMENTATION RATE] IN BLOOD: 37 MM/H (ref 0–30)
HCT VFR BLD AUTO: 38.3 % (ref 37–47)
HGB BLD-MCNC: 12 G/DL (ref 12–16)
LYMPHOCYTES # BLD AUTO: 0.7 10*3/UL (ref 1–5)
LYMPHOCYTES NFR BLD AUTO: 17 % (ref 20–45)
MCH RBC QN AUTO: 25.5 PG (ref 27–31)
MCHC RBC AUTO-ENTMCNC: 31.3 G/DL (ref 33–37)
MCV RBC AUTO: 81.3 FL (ref 81–99)
MONOCYTES # BLD AUTO: 0.36 10*3/UL (ref 0.2–1.2)
MONOCYTES NFR BLD AUTO: 8.7 % (ref 3–10)
NEUTROPHILS # BLD AUTO: 2.74 10*3/UL (ref 2–8)
NEUTROPHILS NFR BLD AUTO: 66.6 % (ref 30–85)
NRBC CBCN: 0 % (ref 0–0.7)
PLATELET # BLD AUTO: 356 10*3/UL (ref 130–400)
PMV BLD AUTO: 8.6 FL (ref 9.4–12.3)
PROCALCITONIN SERPL-MCNC: 0.09 NG/ML (ref 0–4)
RBC # BLD AUTO: 4.71 10*6/UL (ref 4.2–5.4)
WBC # BLD AUTO: 4.12 10*3/UL (ref 4.8–10.8)

## 2019-12-13 LAB — IGE SERPL-ACNC: 30 K[IU]/ML (ref 0–24)

## 2019-12-19 ENCOUNTER — HOSPITAL ENCOUNTER (OUTPATIENT)
Dept: OTHER | Facility: HOSPITAL | Age: 59
Discharge: HOME | End: 2019-12-19

## 2019-12-19 ENCOUNTER — OFFICE VISIT CONVERTED (OUTPATIENT)
Dept: PULMONOLOGY | Facility: CLINIC | Age: 59
End: 2019-12-19
Attending: NURSE PRACTITIONER

## 2019-12-19 LAB
ALBUMIN SERPL-MCNC: 4 G/DL (ref 3.5–5)
ALBUMIN/GLOB SERPL: 1.5 {RATIO} (ref 1.4–2.6)
ALP SERPL-CCNC: 103 U/L (ref 43–160)
ALT SERPL-CCNC: 16 U/L (ref 10–40)
ANION GAP SERPL CALC-SCNC: 17 MMOL/L (ref 8–19)
AST SERPL-CCNC: 12 U/L (ref 15–50)
BASOPHILS # BLD AUTO: 0.03 10*3/UL (ref 0–0.2)
BASOPHILS NFR BLD AUTO: 0.4 % (ref 0–3)
BILIRUB SERPL-MCNC: 0.17 MG/DL (ref 0.2–1.3)
BUN SERPL-MCNC: 27 MG/DL (ref 5–25)
BUN/CREAT SERPL: 23 {RATIO} (ref 6–20)
CALCIUM SERPL-MCNC: 8.9 MG/DL (ref 8.7–10.4)
CHLORIDE SERPL-SCNC: 101 MMOL/L (ref 99–111)
CONV ABS IMM GRAN: 0.03 10*3/UL (ref 0–0.2)
CONV CO2: 25 MMOL/L (ref 22–32)
CONV IMMATURE GRAN: 0.4 % (ref 0–1.8)
CONV TOTAL PROTEIN: 6.6 G/DL (ref 6.3–8.2)
CREAT UR-MCNC: 1.17 MG/DL (ref 0.5–0.9)
DEPRECATED RDW RBC AUTO: 41.4 FL (ref 36.4–46.3)
EOSINOPHIL # BLD AUTO: 0 % (ref 0–7)
EOSINOPHIL # BLD AUTO: 0 10*3/UL (ref 0–0.7)
ERYTHROCYTE [DISTWIDTH] IN BLOOD BY AUTOMATED COUNT: 14 % (ref 11.7–14.4)
GFR SERPLBLD BASED ON 1.73 SQ M-ARVRAT: 50 ML/MIN/{1.73_M2}
GLOBULIN UR ELPH-MCNC: 2.6 G/DL (ref 2–3.5)
GLUCOSE SERPL-MCNC: 108 MG/DL (ref 65–99)
HCT VFR BLD AUTO: 40.8 % (ref 37–47)
HGB BLD-MCNC: 12.6 G/DL (ref 12–16)
LYMPHOCYTES # BLD AUTO: 0.68 10*3/UL (ref 1–5)
LYMPHOCYTES NFR BLD AUTO: 8.4 % (ref 20–45)
MCH RBC QN AUTO: 25.5 PG (ref 27–31)
MCHC RBC AUTO-ENTMCNC: 30.9 G/DL (ref 33–37)
MCV RBC AUTO: 82.4 FL (ref 81–99)
MONOCYTES # BLD AUTO: 0.14 10*3/UL (ref 0.2–1.2)
MONOCYTES NFR BLD AUTO: 1.7 % (ref 3–10)
NEUTROPHILS # BLD AUTO: 7.18 10*3/UL (ref 2–8)
NEUTROPHILS NFR BLD AUTO: 89.1 % (ref 30–85)
NRBC CBCN: 0 % (ref 0–0.7)
OSMOLALITY SERPL CALC.SUM OF ELEC: 294 MOSM/KG (ref 273–304)
PLATELET # BLD AUTO: 533 10*3/UL (ref 130–400)
PMV BLD AUTO: 9.6 FL (ref 9.4–12.3)
POTASSIUM SERPL-SCNC: 4.2 MMOL/L (ref 3.5–5.3)
RBC # BLD AUTO: 4.95 10*6/UL (ref 4.2–5.4)
SODIUM SERPL-SCNC: 139 MMOL/L (ref 135–147)
WBC # BLD AUTO: 8.06 10*3/UL (ref 4.8–10.8)

## 2019-12-20 LAB
A FUMIGATUS AB SER QL ID: <0.1 K[IU]/ML
AMER SYCAMORE IGE QN: <0.1 K[IU]/ML
ASO AB SERPL-ACNC: 20 [IU]/ML (ref 0–200)
BERMUDA GRASS IGE QN: <0.1 K[IU]/ML (ref 0–0.35)
BOXELDER IGE QN: <0.1 K[IU]/ML
CALIF WALNUT POLN IGE QN: <0.1 K[IU]/ML (ref 0–0.35)
CAT DANDER IGG QN: <0.1 K[IU]/ML (ref 0–0.35)
CLADOSPORIUM IGE: <0.1 K[IU]/ML
CMN PIGWEED IGE QN: <0.1 K[IU]/ML
COMMON RAGWEED IGE QN: <0.1 K[IU]/ML (ref 0–0.35)
CONV IMMUNOGLOBULIN G (IGG): 676 MG/DL (ref 700–1600)
CONV IMMUNOGLOBULIN M (IGM): 186 MG/DL (ref 26–217)
CONV RHEUMATOID FACTOR IGM: 10.5 [IU]/ML (ref 0–14)
COTTONWOOD IGE QN: <0.1 K[IU]/ML
CRP SERPL-MCNC: 5.1 MG/L (ref 0–5)
D FARINAE IGE QN: <0.1 K[IU]/ML (ref 0–0.35)
D PTERONYSS IGE QN: <0.1 K[IU]/ML (ref 0–0.35)
DOG DANDER IGE QN: <0.1 K[IU]/ML (ref 0–0.35)
DSDNA AB SER-ACNC: NEGATIVE [IU]/ML
ENA AB SER IA-ACNC: NEGATIVE {RATIO}
GOOSEFOOT IGE QN: <0.1 K[IU]/ML (ref 0–0.35)
IGA SERPL-MCNC: 119 MG/DL (ref 87–352)
IGE SERPL-ACNC: 32 K[IU]/ML (ref 0–24)
IMMUNOCAP RESULT: ABNORMAL (ref 0–0)
JOHNSON GRASS IGE QN: <0.1 K[IU]/ML (ref 0–0.35)
MEADOW FESCUE IGE QN: <0.1 K[IU]/ML (ref 0–0.35)
MOLD IGE: <0.1 K[IU]/ML (ref 0–0.35)
MOUSE URINE PROT IGE QN: <0.1 K[IU]/ML
MT JUNIPER IGE QN: <0.1 K[IU]/ML
OAK DUST IGE QN: <0.1 K[IU]/ML (ref 0–0.35)
P NOTATUM IGE QN: <0.1 K[IU]/ML
PECAN/HICK TREE IGE QN: <0.1 K[IU]/ML (ref 0–0.35)
ROACH IGE QN: <0.1 K[IU]/ML (ref 0–0.35)
TIMOTHY IGE QN: <0.1 K[IU]/ML
URATE SERPL-MCNC: 4.7 MG/DL (ref 2.5–7.5)
WHITE ASH IGE QN: <0.1 K[IU]/ML
WHITE BIRCH IGE QN: <0.1 K[IU]/ML (ref 0–0.35)
WHITE ELM IGE QN: <0.1 K[IU]/ML (ref 0–0.35)
WHITE MULBERRY IGE QN: <0.1 K[IU]/ML

## 2019-12-21 LAB — CONV HIV COMBO AG/AB (HIV-1/O/2) WITH REFLEX: NEGATIVE

## 2019-12-22 LAB
A FLAVUS IGE QN: NEGATIVE
A FLAVUS IGE QN: NEGATIVE
A NIGER AB SER QL: NEGATIVE
A NIGER AB SER QL: NEGATIVE
B DERMAT AB TITR SER: NEGATIVE {TITER}
CONV 1,3-BETA-D-GLUCAN INTERPRETATION: NEGATIVE
CONV 1,3-BETA-D-GLUCAN: <31 PG/ML
CONV ASPERGILLUS FUMIGATUS AB.: NEGATIVE
CONV ASPERGILLUS FUMIGATUS AB.: NEGATIVE
H CAPSUL AB TITR SER ID: NEGATIVE {TITER}

## 2019-12-23 LAB
ASPERGILLUS GALACTOMMAN AG: 0.04 INDEX (ref 0–0.49)
CONV ANTI NEUTROPHILIC CYTOPLASMIC AB W/REFLEX: NORMAL
H CAPSUL AG UR-ACNC: <0.5
Lab: 6.73 MCG/ML

## 2019-12-25 LAB — 21HYDROXYLASE AB SER-ACNC: <0.2 U/ML (ref 0–1)

## 2019-12-27 LAB
A FUMIGATUS1 AB SER-ACNC: NEGATIVE
A FUMIGATUS6 AB SER-ACNC: NEGATIVE
CONV AUREOBASIDIUM PULLULANS AB.: NEGATIVE
CONV MICROPOLYSPORA FAENI ABS.: NEGATIVE
CONV THERMOACTINOMYCES VULGARIS #1 ABS: NEGATIVE
PIGEON SERUM AB QL ID: NEGATIVE

## 2020-01-22 ENCOUNTER — HOSPITAL ENCOUNTER (OUTPATIENT)
Dept: GENERAL RADIOLOGY | Facility: HOSPITAL | Age: 60
Discharge: HOME | End: 2020-01-22
Attending: NURSE PRACTITIONER

## 2020-01-22 LAB
CRP SERPL HS-MCNC: 4.94 MG/DL (ref 0–0.5)
ERYTHROCYTE [SEDIMENTATION RATE] IN BLOOD: 18 MM/H (ref 0–30)

## 2020-01-28 ENCOUNTER — TRANSCRIBE ORDERS (OUTPATIENT)
Dept: ADMINISTRATIVE | Facility: HOSPITAL | Age: 60
End: 2020-01-28

## 2020-01-28 DIAGNOSIS — J82.81 EOSINOPHILIC PNEUMONIA (HCC): Primary | ICD-10-CM

## 2020-01-29 ENCOUNTER — OFFICE VISIT CONVERTED (OUTPATIENT)
Dept: PULMONOLOGY | Facility: CLINIC | Age: 60
End: 2020-01-29
Attending: INTERNAL MEDICINE

## 2020-02-25 ENCOUNTER — APPOINTMENT (OUTPATIENT)
Dept: RESPIRATORY THERAPY | Facility: HOSPITAL | Age: 60
End: 2020-02-25

## 2020-02-25 ENCOUNTER — APPOINTMENT (OUTPATIENT)
Dept: CT IMAGING | Facility: HOSPITAL | Age: 60
End: 2020-02-25

## 2020-03-04 ENCOUNTER — TRANSCRIBE ORDERS (OUTPATIENT)
Dept: LAB | Facility: HOSPITAL | Age: 60
End: 2020-03-04

## 2020-03-04 ENCOUNTER — LAB (OUTPATIENT)
Dept: LAB | Facility: HOSPITAL | Age: 60
End: 2020-03-04

## 2020-03-04 ENCOUNTER — HOSPITAL ENCOUNTER (OUTPATIENT)
Dept: CT IMAGING | Facility: HOSPITAL | Age: 60
Discharge: HOME | End: 2020-03-04

## 2020-03-04 ENCOUNTER — HOSPITAL ENCOUNTER (OUTPATIENT)
Dept: RESPIRATORY THERAPY | Facility: HOSPITAL | Age: 60
Discharge: HOME | End: 2020-03-04
Admitting: INTERNAL MEDICINE

## 2020-03-04 DIAGNOSIS — J82.81 EOSINOPHILIC PNEUMONIA (HCC): ICD-10-CM

## 2020-03-04 DIAGNOSIS — J82.89 PULMONARY EOSINOPHILIA (HCC): Primary | ICD-10-CM

## 2020-03-04 DIAGNOSIS — J82.89 PULMONARY EOSINOPHILIA (HCC): ICD-10-CM

## 2020-03-04 LAB
BDY SITE: NORMAL
HGB BLDA-MCNC: 13.3 G/DL

## 2020-03-04 PROCEDURE — 83520 IMMUNOASSAY QUANT NOS NONAB: CPT

## 2020-03-04 PROCEDURE — 86256 FLUORESCENT ANTIBODY TITER: CPT

## 2020-03-04 PROCEDURE — 36415 COLL VENOUS BLD VENIPUNCTURE: CPT

## 2020-03-04 PROCEDURE — 94726 PLETHYSMOGRAPHY LUNG VOLUMES: CPT

## 2020-03-04 PROCEDURE — 71250 CT THORAX DX C-: CPT

## 2020-03-04 PROCEDURE — 94640 AIRWAY INHALATION TREATMENT: CPT

## 2020-03-04 PROCEDURE — 94729 DIFFUSING CAPACITY: CPT

## 2020-03-04 PROCEDURE — 82820 HEMOGLOBIN-OXYGEN AFFINITY: CPT | Performed by: INTERNAL MEDICINE

## 2020-03-04 PROCEDURE — 94060 EVALUATION OF WHEEZING: CPT

## 2020-03-04 RX ORDER — ALBUTEROL SULFATE 2.5 MG/3ML
2.5 SOLUTION RESPIRATORY (INHALATION) ONCE
Status: COMPLETED | OUTPATIENT
Start: 2020-03-04 | End: 2020-03-04

## 2020-03-04 RX ADMIN — ALBUTEROL SULFATE 2.5 MG: 2.5 SOLUTION RESPIRATORY (INHALATION) at 14:16

## 2020-03-06 LAB
C-ANCA TITR SER IF: ABNORMAL TITER
MYELOPEROXIDASE AB SER-ACNC: <9 U/ML (ref 0–9)
P-ANCA ATYPICAL TITR SER IF: ABNORMAL TITER
P-ANCA TITR SER IF: ABNORMAL TITER
PROTEINASE3 AB SER IA-ACNC: <3.5 U/ML (ref 0–3.5)

## 2020-06-12 ENCOUNTER — HOSPITAL ENCOUNTER (OUTPATIENT)
Dept: OTHER | Facility: HOSPITAL | Age: 60
Discharge: HOME | End: 2020-06-12

## 2020-06-12 LAB
BASOPHILS # BLD AUTO: 0.04 10*3/UL (ref 0–0.2)
BASOPHILS NFR BLD AUTO: 0.5 % (ref 0–3)
CONV ABS IMM GRAN: 0.02 10*3/UL (ref 0–0.2)
CONV IMMATURE GRAN: 0.3 % (ref 0–1.8)
CONV RHEUMATOID FACTOR IGM: 10.4 [IU]/ML (ref 0–14)
CRP SERPL-MCNC: 4.5 MG/L (ref 0–5)
DEPRECATED RDW RBC AUTO: 48.7 FL (ref 36.4–46.3)
EOSINOPHIL # BLD AUTO: 0.1 10*3/UL (ref 0–0.7)
EOSINOPHIL # BLD AUTO: 1.3 % (ref 0–7)
ERYTHROCYTE [DISTWIDTH] IN BLOOD BY AUTOMATED COUNT: 16.1 % (ref 11.7–14.4)
ERYTHROCYTE [SEDIMENTATION RATE] IN BLOOD: 5 MM/H (ref 0–30)
HCT VFR BLD AUTO: 44.1 % (ref 37–47)
HGB BLD-MCNC: 13.7 G/DL (ref 12–16)
LYMPHOCYTES # BLD AUTO: 1.04 10*3/UL (ref 1–5)
LYMPHOCYTES NFR BLD AUTO: 14 % (ref 20–45)
MCH RBC QN AUTO: 25.8 PG (ref 27–31)
MCHC RBC AUTO-ENTMCNC: 31.1 G/DL (ref 33–37)
MCV RBC AUTO: 82.9 FL (ref 81–99)
MONOCYTES # BLD AUTO: 0.23 10*3/UL (ref 0.2–1.2)
MONOCYTES NFR BLD AUTO: 3.1 % (ref 3–10)
NEUTROPHILS # BLD AUTO: 6.01 10*3/UL (ref 2–8)
NEUTROPHILS NFR BLD AUTO: 80.8 % (ref 30–85)
NRBC CBCN: 0 % (ref 0–0.7)
PLATELET # BLD AUTO: 298 10*3/UL (ref 130–400)
PMV BLD AUTO: 9.9 FL (ref 9.4–12.3)
RBC # BLD AUTO: 5.32 10*6/UL (ref 4.2–5.4)
WBC # BLD AUTO: 7.44 10*3/UL (ref 4.8–10.8)

## 2020-06-14 LAB
DSDNA AB SER-ACNC: NEGATIVE [IU]/ML
ENA AB SER IA-ACNC: NEGATIVE {RATIO}
IGE SERPL-ACNC: 17 K[IU]/ML (ref 0–24)

## 2020-06-15 LAB
ENA SS-B AB SER-ACNC: <0.2 AI (ref 0–0.9)
SJOGREN'S ANTI-SS-A: <0.2 AI (ref 0–0.9)

## 2020-06-16 LAB
ACE SERPL-CCNC: 32 U/L (ref 14–82)
CCP IGA+IGG SERPL IA-ACNC: 5 UNITS (ref 0–19)
CONV ANTI NEUTROPHILIC CYTOPLASMIC AB W/REFLEX: NORMAL

## 2020-09-30 ENCOUNTER — HOSPITAL ENCOUNTER (OUTPATIENT)
Dept: LAB | Facility: HOSPITAL | Age: 60
Discharge: HOME | End: 2020-09-30
Attending: INTERNAL MEDICINE

## 2020-09-30 LAB
ALBUMIN SERPL-MCNC: 4.3 G/DL (ref 3.5–5)
ALBUMIN/GLOB SERPL: 1.7 {RATIO} (ref 1.4–2.6)
ALP SERPL-CCNC: 96 U/L (ref 43–160)
ALT SERPL-CCNC: 18 U/L (ref 10–40)
ANION GAP SERPL CALC-SCNC: 14 MMOL/L (ref 8–19)
AST SERPL-CCNC: 20 U/L (ref 15–50)
BASOPHILS # BLD AUTO: 0.05 10*3/UL (ref 0–0.2)
BASOPHILS NFR BLD AUTO: 0.7 % (ref 0–3)
BILIRUB SERPL-MCNC: 0.24 MG/DL (ref 0.2–1.3)
BUN SERPL-MCNC: 25 MG/DL (ref 5–25)
BUN/CREAT SERPL: 28 {RATIO} (ref 6–20)
CALCIUM SERPL-MCNC: 8.8 MG/DL (ref 8.7–10.4)
CHLORIDE SERPL-SCNC: 100 MMOL/L (ref 99–111)
CONV ABS IMM GRAN: 0.01 10*3/UL (ref 0–0.2)
CONV CO2: 27 MMOL/L (ref 22–32)
CONV IMMATURE GRAN: 0.1 % (ref 0–1.8)
CONV TOTAL PROTEIN: 6.8 G/DL (ref 6.3–8.2)
CREAT UR-MCNC: 0.9 MG/DL (ref 0.5–0.9)
DEPRECATED RDW RBC AUTO: 45.2 FL (ref 36.4–46.3)
EOSINOPHIL # BLD AUTO: 0.09 10*3/UL (ref 0–0.7)
EOSINOPHIL # BLD AUTO: 1.2 % (ref 0–7)
ERYTHROCYTE [DISTWIDTH] IN BLOOD BY AUTOMATED COUNT: 14.2 % (ref 11.7–14.4)
GFR SERPLBLD BASED ON 1.73 SQ M-ARVRAT: >60 ML/MIN/{1.73_M2}
GLOBULIN UR ELPH-MCNC: 2.5 G/DL (ref 2–3.5)
GLUCOSE SERPL-MCNC: 109 MG/DL (ref 65–99)
HCT VFR BLD AUTO: 46.1 % (ref 37–47)
HGB BLD-MCNC: 13.9 G/DL (ref 12–16)
LYMPHOCYTES # BLD AUTO: 1.1 10*3/UL (ref 1–5)
LYMPHOCYTES NFR BLD AUTO: 15.1 % (ref 20–45)
MCH RBC QN AUTO: 26 PG (ref 27–31)
MCHC RBC AUTO-ENTMCNC: 30.2 G/DL (ref 33–37)
MCV RBC AUTO: 86.2 FL (ref 81–99)
MONOCYTES # BLD AUTO: 0.28 10*3/UL (ref 0.2–1.2)
MONOCYTES NFR BLD AUTO: 3.8 % (ref 3–10)
NEUTROPHILS # BLD AUTO: 5.75 10*3/UL (ref 2–8)
NEUTROPHILS NFR BLD AUTO: 79.1 % (ref 30–85)
NRBC CBCN: 0 % (ref 0–0.7)
OSMOLALITY SERPL CALC.SUM OF ELEC: 289 MOSM/KG (ref 273–304)
PLATELET # BLD AUTO: 331 10*3/UL (ref 130–400)
PMV BLD AUTO: 10.5 FL (ref 9.4–12.3)
POTASSIUM SERPL-SCNC: 4.2 MMOL/L (ref 3.5–5.3)
RBC # BLD AUTO: 5.35 10*6/UL (ref 4.2–5.4)
SODIUM SERPL-SCNC: 137 MMOL/L (ref 135–147)
T4 FREE SERPL-MCNC: 1.4 NG/DL (ref 0.9–1.8)
TSH SERPL-ACNC: 0.38 M[IU]/L (ref 0.27–4.2)
WBC # BLD AUTO: 7.28 10*3/UL (ref 4.8–10.8)

## 2020-10-01 LAB
FOLATE SERPL-MCNC: 12.6 NG/ML (ref 4.8–20)
IRON SATN MFR SERPL: 16 % (ref 20–55)
IRON SERPL-MCNC: 69 UG/DL (ref 60–170)
TIBC SERPL-MCNC: 423 UG/DL (ref 245–450)
TRANSFERRIN SERPL-MCNC: 296 MG/DL (ref 250–380)
VIT B12 SERPL-MCNC: 365 PG/ML (ref 211–911)

## 2020-10-02 LAB — T3FREE SERPL-MCNC: 2.2 PG/ML (ref 2–4.4)

## 2020-10-06 ENCOUNTER — HOSPITAL ENCOUNTER (OUTPATIENT)
Dept: GENERAL RADIOLOGY | Facility: HOSPITAL | Age: 60
Discharge: HOME | End: 2020-10-06
Attending: INTERNAL MEDICINE

## 2021-01-04 ENCOUNTER — TRANSCRIBE ORDERS (OUTPATIENT)
Dept: ADMINISTRATIVE | Facility: HOSPITAL | Age: 61
End: 2021-01-04

## 2021-01-04 DIAGNOSIS — J82.81 EOSINOPHILIC PNEUMONIA (HCC): Primary | ICD-10-CM

## 2021-01-21 ENCOUNTER — HOSPITAL ENCOUNTER (OUTPATIENT)
Dept: CT IMAGING | Facility: HOSPITAL | Age: 61
Discharge: HOME | End: 2021-01-21
Admitting: INTERNAL MEDICINE

## 2021-01-21 DIAGNOSIS — J82.81 EOSINOPHILIC PNEUMONIA (HCC): ICD-10-CM

## 2021-01-21 PROCEDURE — 71250 CT THORAX DX C-: CPT

## 2021-03-16 ENCOUNTER — HOSPITAL ENCOUNTER (OUTPATIENT)
Dept: VACCINE CLINIC | Facility: HOSPITAL | Age: 61
Discharge: HOME | End: 2021-03-16
Attending: INTERNAL MEDICINE

## 2021-04-06 ENCOUNTER — HOSPITAL ENCOUNTER (OUTPATIENT)
Dept: VACCINE CLINIC | Facility: HOSPITAL | Age: 61
Discharge: HOME | End: 2021-04-06
Attending: INTERNAL MEDICINE

## 2021-05-15 VITALS — HEART RATE: 104 BPM | HEIGHT: 63 IN | WEIGHT: 148 LBS | BODY MASS INDEX: 26.22 KG/M2 | OXYGEN SATURATION: 95 %

## 2021-05-15 VITALS — HEART RATE: 97 BPM | HEIGHT: 63 IN | OXYGEN SATURATION: 98 % | BODY MASS INDEX: 26.22 KG/M2 | WEIGHT: 148 LBS

## 2021-05-15 VITALS — BODY MASS INDEX: 26.57 KG/M2 | OXYGEN SATURATION: 98 % | HEIGHT: 63 IN | HEART RATE: 76 BPM

## 2021-05-16 VITALS
HEIGHT: 63 IN | RESPIRATION RATE: 12 BRPM | SYSTOLIC BLOOD PRESSURE: 125 MMHG | HEART RATE: 63 BPM | OXYGEN SATURATION: 98 % | WEIGHT: 148.25 LBS | BODY MASS INDEX: 26.27 KG/M2 | DIASTOLIC BLOOD PRESSURE: 72 MMHG

## 2021-05-28 VITALS
OXYGEN SATURATION: 95 % | TEMPERATURE: 97.8 F | SYSTOLIC BLOOD PRESSURE: 143 MMHG | HEART RATE: 65 BPM | RESPIRATION RATE: 16 BRPM | WEIGHT: 152 LBS | HEIGHT: 63 IN | BODY MASS INDEX: 26.93 KG/M2 | DIASTOLIC BLOOD PRESSURE: 70 MMHG

## 2021-05-28 VITALS
RESPIRATION RATE: 14 BRPM | TEMPERATURE: 98.1 F | BODY MASS INDEX: 25.34 KG/M2 | TEMPERATURE: 97.7 F | HEART RATE: 71 BPM | WEIGHT: 155.44 LBS | TEMPERATURE: 97.9 F | HEIGHT: 63 IN | HEIGHT: 63 IN | DIASTOLIC BLOOD PRESSURE: 73 MMHG | OXYGEN SATURATION: 95 % | OXYGEN SATURATION: 93 % | TEMPERATURE: 98.3 F | SYSTOLIC BLOOD PRESSURE: 119 MMHG | DIASTOLIC BLOOD PRESSURE: 53 MMHG | RESPIRATION RATE: 14 BRPM | TEMPERATURE: 98.3 F | WEIGHT: 147 LBS | BODY MASS INDEX: 26.31 KG/M2 | HEART RATE: 60 BPM | HEART RATE: 62 BPM | BODY MASS INDEX: 27.54 KG/M2 | HEART RATE: 87 BPM | SYSTOLIC BLOOD PRESSURE: 144 MMHG | HEIGHT: 63 IN | DIASTOLIC BLOOD PRESSURE: 66 MMHG | HEART RATE: 60 BPM | RESPIRATION RATE: 16 BRPM | OXYGEN SATURATION: 96 % | SYSTOLIC BLOOD PRESSURE: 135 MMHG | DIASTOLIC BLOOD PRESSURE: 74 MMHG | HEIGHT: 63 IN | HEIGHT: 63 IN | RESPIRATION RATE: 14 BRPM | DIASTOLIC BLOOD PRESSURE: 65 MMHG | WEIGHT: 143 LBS | OXYGEN SATURATION: 97 % | OXYGEN SATURATION: 93 % | WEIGHT: 153 LBS | BODY MASS INDEX: 26.05 KG/M2 | SYSTOLIC BLOOD PRESSURE: 143 MMHG | BODY MASS INDEX: 27.11 KG/M2 | SYSTOLIC BLOOD PRESSURE: 139 MMHG | WEIGHT: 148.5 LBS | RESPIRATION RATE: 14 BRPM

## 2021-05-28 VITALS
DIASTOLIC BLOOD PRESSURE: 62 MMHG | HEIGHT: 63 IN | SYSTOLIC BLOOD PRESSURE: 122 MMHG | BODY MASS INDEX: 26.93 KG/M2 | OXYGEN SATURATION: 95 % | HEART RATE: 66 BPM | DIASTOLIC BLOOD PRESSURE: 50 MMHG | RESPIRATION RATE: 16 BRPM | WEIGHT: 150.56 LBS | TEMPERATURE: 98.4 F | SYSTOLIC BLOOD PRESSURE: 102 MMHG | RESPIRATION RATE: 16 BRPM | OXYGEN SATURATION: 92 % | HEART RATE: 89 BPM | BODY MASS INDEX: 26.68 KG/M2 | HEIGHT: 63 IN | WEIGHT: 152 LBS

## 2021-05-28 VITALS
SYSTOLIC BLOOD PRESSURE: 146 MMHG | HEART RATE: 65 BPM | RESPIRATION RATE: 12 BRPM | DIASTOLIC BLOOD PRESSURE: 73 MMHG | HEIGHT: 63 IN | TEMPERATURE: 98 F | OXYGEN SATURATION: 99 % | BODY MASS INDEX: 26.93 KG/M2 | WEIGHT: 152 LBS

## 2021-05-28 NOTE — PROGRESS NOTES
Patient: TY MULLINS     Acct: [id number]     Report: #[id number]  UNIT #: [id number]     : 1960    Encounter Date:2018  PRIMARY CARE: XAVI DAVIS  ***Signed***  --------------------------------------------------------------------------------------------------------------------  Chief Complaint      Encounter Date      Sep 17, 2018            Primary Care Provider      XAVI DAVIS            Referring Provider      XAVI DAVIS            Patient Complaint      Patient is complaining of      1 Week F/U Per [facility] After Bronch Procedure            VITALS      Height 5 ft 3 in / 160.02 cm      Weight 148 lbs 8 oz / 67.310798 kg      BSA 1.75 m2      BMI 26.3 kg/m2      Temperature 98.1 F / 36.72 C - Oral      Pulse 87      Respirations 16      Blood Pressure 144/66 Sitting, Left Arm      Pulse Oximetry 93%, room air      Exhaled Nitrous Oxide Testin            HPI      The patient is a very pleasant 58 year old white female who was seen by Dr. Peterson as a new patient on 18. She had normally been very healthy and     active to the point where she can run marathons. She sees Dr. Davis for her     primary care and about a month and a half ago she began having increased     shortness of breath, chest pain, fevers and chills. She was diagnosed with     pneumonia based on chest x-ray but failed multiple courses of antibiotics and     steroids. She was even taking to [facility] emergency room at one point. Chest    CT scan was done showing small bilateral pleural effusions greater in the right     than the left as well as probable bibasilar pneumonia. Despite multiple rounds     of antibiotics, she was not improving and underwent bronchoscopy by Dr. Harmon.    I have reviewed those results with her today. The pathology I have so far     available is negative for any malignant cells and all her bronchoscopy cultures     are negative. This includes fungal, bacterial and acid fast  bacillus cultures     all negative to day. She also had a right sided thoracentesis performed by Dr. Marquez with 700 ml of anival serosanguineous fluid drained. The patient states     that she has not felt any better and actually felt worse initially after both     the thoracentesis and bronchoscopy. She continues to be dyspneic with minimal     exertion, relieved with rest. She denies any increased coughing or wheezing.      She denies hemoptysis, fevers or chills.  She did have some spasm type pain     underneath her breast after her bronchoscopy and right sided thoracentesis. She     was given Flexeril and her symptoms have improved. She was previously diagnosed     with asthma and previously used an albuterol inhaler with exercise. Now when she    uses an inhaler she does not feel like it helps her very much. She admits to     some mild orthopnea, denies any lower extremity edema, abdominal distension or     weight gain. She had some initial inflammatory work up done by Dr. Peterson and had    an elevated ESR at 44 as well as an elevated CRP at 114. She also had peripheral    eosinophilia on CBC and mildly elevated eosinophils on pleural fluid.             I have reviewed her Review of Systems medical, surgical and family history and     agree with those as entered.            ROS      Constitutional:  Denies: Fatigue, Fever, Weight gain, Weight loss, Chills,     Insomnia, Other      Respiratory/Breathing:  Complains of: Shortness of air; Denies: Wheezing, Cough,    Hemoptysis, Pleuritic pain, Other      Endocrine:  Denies: Polydipsia, Polyuria, Heat/cold intolerance, Abnorml     menstrual pattern, Diabetes, Other      Eyes:  Denies: Blurred vision, Vision Changes, Other      Ears, nose, mouth, throat:  Denies: Mouth lesions, Thrush, Throat pain,     Hoarseness, Allergies/Hay Fever, Post Nasal Drip, Headaches, Recent Head Injury,    Nose Bleeding, Neck Stiffness, Thyroid Mass, Hearing Loss, Ear Fullness, Dry      Mouth, Nasal or Sinus Pain, Dry Lips, Nasal discharge, Nasal congestion, Other      Cardiovascular:  Denies: Palpitations, Syncope, Claudication, Chest Pain, Wake     up Gasping for air, Leg Swelling, Irregular Heart Rate, Cyanosis, Dyspnea on     Exertion, Other      Gastrointestinal:  Denies: Nausea, Constipation, Diarrhea, Abdominal pain,     Vomiting, Difficulty Swallowing, Reflux/Heartburn, Dysphagia, Jaundice,     Bloating, Melena, Bloody stools, Other      Genitourinary:  Denies: Urinary frequency, Incontinence, Hematuria, Urgency,     Nocturia, Dysuria, Testicular problems, Other      Musculoskeletal:  Denies: Joint Pain, Joint Stiffness, Joint Swelling, Myalgias,    Other      Hematologic/lymphatic:  DENIES: Lymphadenopathy, Bruising, Bleeding tendencies,     Other      Neurological:  Denies: Headache, Numbness, Weakness, Seizures, Other      Psychiatric:  Denies: Anxiety, Appropriate Effect, Depression, Other      Sleep:  No: Excessive daytime sleep, Morning Headache?, Snoring, Insomnia?, Stop    breathing at sleep?, Other      Integumentary:  Denies: Rash, Dry skin, Skin Warm to Touch, Other      Immunologic/Allergic:  Denies: Latex allergy, Seasonal allergies, Asthma,     Urticaria, Eczema, Other      Immunization status:  No: Up to date            FAMILY/SOCIAL/MEDICAL HX      Surgical History:  No: AAA Repair, Abdominal Surgery, Adenoids, Angioplasty,     Appendectomy, Back Surgery, Bladder Surgery, Bowel Surgery, Breast Surgery,     CABG, Carotid Stenosis, Cholecystectomy, Ear Surgery, Eye Surgery, Head Surgery,    Hernia Surgery, Kidney Surgery, Nose Surgery, Oral Surgery, Orthopedic Surgery,     Prostatectomy, Rectal Surgery, Spinal Surgery, Testicular Surgery, Throat Lyon    rgery, Tonsils, Valve Replacement, Vascular Surgery, Other Surgeries      Heart - Family Hx:  Mother, Father, Brother      Cancer/Type - Family Hx:  Sister      Is Father Still Living?:  No      Is Mother Still Living?:  No        Family History:  Yes      Social History:  No Tobacco Use, No Alcohol Use, No Recreational Drug use      Smoking status:  Never smoker       Section:  Yes      Anticoagulation Therapy:  No      Antibiotic Prophylaxis:  No      Medical History:  Yes: Asthma (MILD), Hemorrhoids/Rectal Prob (REFLUX, HIATAL     HERNIA), High Cholesterol, Shortness Of Breath (COUGH), Thyroid Problem (hypo);     No: Alcoholism, Allergies, Anemia, Arthritis, Atrial Fibrillation, Blood     Disease, Broken Bones, Cataracts, Chemical Dependency, Chemotherapy/Cancer,     Chronic Bronchitis/COPD, Emphysema, Chronic Liver Disease, Colon Trouble, Co    litis, Diverticulitis, Congestive Heart Failu, Deafness or Ringing Ears,     Convulsions, Depression, Anxiety, Bipolar Disorder, PTSD, Diabetes, Epilepsy,     Seizures, Forgetfullness, Glaucoma, Gall Stones, Gout, Head Injury, Heart     Attack, Heart Murmur, GERD, Hepatitis, Hiatal Hernia, High Blood Pressure, HIV     (Do not ask - volu, Jaundice, Kidney or Bladder Disease, Kidney Stones, Migrane     Headaches, Mitral Valve Prolapse, Night sweats, Phlebitis, Psychiatric Care,     Reflux Disease, Rheumatic Fever, Sexually Transmitted Dis, Sinus Trouble, Skin     Disease/Psoriais/Ecz, Stroke, Tuberculosis or Pos TB Te, Miscellaneous     Medical/oth            PREVENTION      Hx Influenza Vaccination:  No      Influenza Vaccine Declined:  Yes      2 or More Falls Past Year?:  No      Fall Past Year with Injury?:  No      Hx Pneumococcal Vaccination:  No      Encouraged to follow-up with:  PCP regarding preventative exams.      Chart initiated by      Janay Abreu ma            ALLERGIES/MEDICATIONS      Allergies:        Coded Allergies:             LATEX (Verified  Allergy, Severe, SWELLING, 18)      Medications    Last Reconciled on 18 11:57 by SHRUTI LYNN      predniSONE* (predniSONE*) 50 Mg Tablet      50 MG PO QDAY, #14 TAB         Prov: Catina Chen PA-C          9/17/18       Lactobacillus Rhamnosus  (Probiotic Digestive Care) 1 Each Capsule      1 EACH PO, CAP         Reported         9/11/18       Albuterol (Proair HFA*) 8.5 Gm Inh      1-2 PUFFS INH RTQ6H PRN for SHORTNESS OF BREATH, #1 INH 0 Refills         Reported         3/8/18       Escitalopram Oxalate (Escitalopram Oxalate*) 10 Mg Tablet      10 MG PO HS, TAB         Reported         3/8/18       Levothyroxine (Levothyroxine) 0.088 Mg Tablet      0.088 MG PO QDAY, #30 TAB 0 Refills         Reported         3/8/18      Current Medications      Current Medications Reviewed 9/17/18            EXAM      CONSTITUTIONAL: Pleasant female in no acute distress,  normal conversant.       EYES : Pink conjunctive, no ptosis, PERRL.       ENMT : Nose and ears appear normal, normal dentition, mild posterior pharyngeal     wall erythema. Mallampati classification       Neck: Nontender, no masses, no thyromegaly, no nodules.      Resp : Grossly clear to auscultation bilaterally without wheezes, rhonchi or     crackles appreciated. Normal work of breathing noted.       CVS  : No carotid bruits, s1s2 nl, RRR, no murmur, rubs or gallop, no peripheral    edema       Chest wall: Normal rise with inspiration, nontender on palpation      GI   : Abdomen soft, with no masses, no hepatosplenomegaly, no hernias, BS+      MSK  : Normal gait and station, no digital cyanosis or clubbing       Skin : No rashes, ulcerations or lesions, normal turgor and temperature      Neuro: CN II - XII intact, no sensory deficits, DTRs intact and symmetrical, no     motor weakness      Psych: Appropriate affect, A   Vtials      Vitals:             Height 5 ft 3 in / 160.02 cm           Weight 148 lbs 8 oz / 67.315244 kg           BSA 1.75 m2           BMI 26.3 kg/m2           Temperature 98.1 F / 36.72 C - Oral           Pulse 87           Respirations 16           Blood Pressure 144/66 Sitting, Left Arm           Pulse Oximetry 93%, room air             REVIEW      Results Reviewed      PCCS Results Reviewed?:  Yes Prev Lab Results, Yes Prev Radiology Results, Yes     Previous [facility] Records      Lab Results      I personally reviewed the patient's microbiology, pathology, recent fluid     cultures etc.            Assessment      Pneumonia - J18.9            Bilateral pleural effusion - J90            SOB (shortness of breath) - R06.02            Notes      New Medications      * predniSONE* 50 MG TABLET: 50 MG PO QDAY #14         Dx: Pneumonia - J18.9      Discontinued Medications      * CYCLOBENZAPRINE HCL (Cyclobenzaprine*) 5 MG TABLET: 5 MG PO Q8H PRN MUSCLE       SPASMS #10      New Diagnostics      * Chest W/O Cont CT, As Soon As Possible         Dx: Pneumonia - J18.9      * Sed Rate, 5 DAYS         Dx: Pneumonia - J18.9      * CRP High Sensitive, 5 DAYS         Dx: Pneumonia - J18.9      * RA Profile, Routine         Dx: Pneumonia - J18.9      * PANCHO Screen/Reflex Hep Titer, Week         Dx: Pneumonia - J18.9      * Anticytoplas. Neutro ANCA, Routine         Dx: Pneumonia - J18.9      * Immunoglobulin  E (I, Week         Dx: Pneumonia - J18.9      * Centromere Antibody , Week         Dx: Pneumonia - J18.9      * Sjogrens Ssa Antibod, Week         Dx: Pneumonia - J18.9      * Sjogrens Ssb Antibod, Week         Dx: Pneumonia - J18.9      ASSESSMENT:      1. Exertional dyspnea ongoing despite recent right sided thoracentesis and     bronchoscopy.       2. 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      3. Elevated eosinophils.       4. Elevated inflammatory markers including CRP and ESR.      5. History of asthma.       6. Bilateral pleural effusions status post right thoracentesis.            PLAN:      1. I have discussed with the patient in detail all of her results and I have     also discussed her case with Dr. Peterson. At this time we do not have a clear     etiology for her ongoing dyspnea but as she did have an elevated ESR and CRP, I      will proceed with connective tissue disease work up and we will start her on     high dose Prednisone for concern for possible eosinophilic pneumonia. I will     give her 50 mg of Prednisone PO daily for the next 2 weeks and repeat a CT scan     at the end of this week or early next week to see if there is any improvement.       2. I will recheck an ESR and CRP to see if those have improved after a week of     steroid treatment.       3. Follow up with me in 1 week, sooner if needed. All her questions have been     answered.            Patient Education      Time Spent:  > 50% /Coord Care                 Disclaimer: Converted document may not contain table formatting or lab diagrams. Please see [employer] [facility] for the authenticated document.

## 2021-05-28 NOTE — PROGRESS NOTES
Patient: TY MULLINS     Acct: [id number]     Report: #[id number]  UNIT #: [id number]     : 1960    Encounter Date:2019  PRIMARY CARE: XAVI LONG  ***Signed***  --------------------------------------------------------------------------------------------------------------------  Chief Complaint      Encounter Date      2019            Primary Care Provider      XAVI LONG            Referring Provider      XAVI LONG            Patient Complaint      Patient is complaining of      Pt here for SOA, Chest hurting/Dyspnea            VITALS      Height 63 in / 160.02 cm      Weight 147 lbs  / 66.502545 kg      BSA 1.70 m2      BMI 26.0 kg/m2      Temperature 97.9 F / 36.61 C - Oral      Pulse 62      Respirations 14      Blood Pressure 119/53 Sitting, Right Arm      Pulse Oximetry 93%, room air      Initial Exhaled Nitrous Oxide      Exhaled Nitrous Oxide Results:  26            HPI      The patient is a 59 year old white female was a patient of Dr. Marquez's and was    seen by me for an acute visit in early August. The patient had previous     nonresolving pneumonia in the fall, had bronchoscopy with transbronchial     biopsies that showed nonnecrotizing granulomatous inflammation felt to be     consistent with eosinophilic pneumonia or cryptogenic organizing pneumonia. She     was treated with a low tapered course of prednisone over several months and her     symptoms resolved. Her follow up CT scan of the chest in December showed     resolution of the previous pneumonia and 5 mm right upper lobe nodule. I also     ordered a 1 year follow up CT scan of the chest to be done in December. When I     saw her in August, she was feeling worse again with dyspnea and cough as well as    fatigue. She had a chest x-ray done by her primary care provider Dr. Long that    showed a right lower lobe airspace disease felt to be pneumonia or atelectasis.     She was already on antibiotics and  a slow tapered course or prednisone by her     primary care provider when I last saw her. I instructed her to continue these     and ordered a follow up chest x-ray and instructed her to follow up in the     office.  Unfortunately she was noncompliant with both of these. She felt better     after completing the prednisone, apparently had an injury where she fractured h    er foot and never followed up in the office and never had follow up chest x-ray     done. Over the past 3-4 weeks she is again having increased dyspnea, coughing or    wheezing, pleuritic type chest pain when she coughs and soreness in her chest.      She says she has been coughing up purulent green and brown phlegm. She is not     currently on any antibiotics or steroids. She apparently saw Dr. Davis again     last week and was not prescribed any additional antibiotics or prednisone. He     did start her on Symbicort 160 which she has been using for several days and she    has not noticed any difference being on the Symbicort so far.             I reviewed her Review of Systems, medical, surgical and family history and agree    with those as entered.      Copies To:   CAS ROMAN      Constitutional:  Denies: Fatigue, Fever, Weight gain, Weight loss, Chills,     Insomnia, Other      Respiratory/Breathing:  Complains of: Shortness of air, Wheezing, Cough, Other     (left side lung pain ); Denies: Hemoptysis, Pleuritic pain      Endocrine:  Denies: Polydipsia, Polyuria, Heat/cold intolerance, Abnorml     menstrual pattern, Diabetes, Other      Eyes:  Denies: Blurred vision, Vision Changes, Other      Ears, nose, mouth, throat:  Denies: Mouth lesions, Thrush, Throat pain,     Hoarseness, Allergies/Hay Fever, Post Nasal Drip, Headaches, Recent Head Injury,    Nose Bleeding, Neck Stiffness, Thyroid Mass, Hearing Loss, Ear Fullness, Dry     Mouth, Nasal or Sinus Pain, Dry Lips, Nasal discharge, Nasal congestion, Other       Cardiovascular:  Denies: Palpitations, Syncope, Claudication, Chest Pain, Wake     up Gasping for air, Leg Swelling, Irregular Heart Rate, Cyanosis, Dyspnea on     Exertion, Other      Gastrointestinal:  Denies: Nausea, Constipation, Diarrhea, Abdominal pain,     Vomiting, Difficulty Swallowing, Reflux/Heartburn, Dysphagia, Jaundice,     Bloating, Melena, Bloody stools, Other      Genitourinary:  Denies: Urinary frequency, Incontinence, Hematuria, Urgency,     Nocturia, Dysuria, Testicular problems, Other      Musculoskeletal:  Denies: Joint Pain, Joint Stiffness, Joint Swelling, Myalgias,    Other      Hematologic/lymphatic:  DENIES: Lymphadenopathy, Bruising, Bleeding tendencies,     Other      Neurological:  Denies: Headache, Numbness, Weakness, Seizures, Other      Psychiatric:  Denies: Anxiety, Appropriate Effect, Depression, Other      Sleep:  No: Excessive daytime sleep, Morning Headache?, Snoring, Insomnia?, Stop    breathing at sleep?, Other      Integumentary:  Denies: Rash, Dry skin, Skin Warm to Touch, Other      Immunologic/Allergic:  Denies: Latex allergy, Seasonal allergies, Asthma,     Urticaria, Eczema, Other      Immunization status:  No: Up to date            FAMILY/SOCIAL/MEDICAL HX      Surgical History:  No: AAA Repair, Abdominal Surgery, Adenoids, Angioplasty,     Appendectomy, Back Surgery, Bladder Surgery, Bowel Surgery, Breast Surgery,     CABG, Carotid Stenosis, Cholecystectomy, Ear Surgery, Eye Surgery, Head Surgery,    Hernia Surgery, Kidney Surgery, Nose Surgery, Oral Surgery, Orthopedic Surgery,     Prostatectomy, Rectal Surgery, Spinal Surgery, Testicular Surgery, Throat     Surgery, Tonsils, Valve Replacement, Vascular Surgery, Other Surgeries      Heart - Family Hx:  Mother, Father, Brother      Cancer/Type - Family Hx:  Sister      Is Father Still Living?:  No      Is Mother Still Living?:  No       Family History:  Yes      Social History:  No Tobacco Use, No Alcohol Use, No  Recreational Drug use      Smoking status:  Never smoker       Section:  Yes      Anticoagulation Therapy:  No      Antibiotic Prophylaxis:  No      Medical History:  Yes: Asthma (MILD), Hemorrhoids/Rectal Prob (REFLUX, HIATAL HE    RNIA), High Cholesterol, Shortness Of Breath (COUGH), Thyroid Problem (hypo);     No: Alcoholism, Allergies, Anemia, Arthritis, Atrial Fibrillation, Blood     Disease, Broken Bones, Cataracts, Chemical Dependency, Chemotherapy/Cancer,     Chronic Bronchitis/COPD, Emphysema, Chronic Liver Disease, Colon Trouble,     Colitis, Diverticulitis, Congestive Heart Failu, Deafness or Ringing Ears,     Convulsions, Depression, Anxiety, Bipolar Disorder, PTSD, Diabetes, Epilepsy,     Seizures, Forgetfullness, Glaucoma, Gall Stones, Gout, Head Injury, Heart     Attack, Heart Murmur, GERD, Hepatitis, Hiatal Hernia, High Blood Pressure, HIV     (Do not ask - volu, Jaundice, Kidney or Bladder Disease, Kidney Stones, Migrane     Headaches, Mitral Valve Prolapse, Night sweats, Phlebitis, Psychiatric Care,     Reflux Disease, Rheumatic Fever, Sexually Transmitted Dis, Sinus Trouble, Skin     Disease/Psoriais/Ecz, Stroke, Tuberculosis or Pos TB Te, Miscellaneous     Medical/oth      Psychiatric History      None            PREVENTION      Hx Influenza Vaccination:  No      Influenza Vaccine Declined:  Yes      2 or More Falls Past Year?:  No      Fall Past Year with Injury?:  No      Hx Pneumococcal Vaccination:  No      Encouraged to follow-up with:  PCP regarding preventative exams.      Chart initiated by      Chiqui Frey MA            ALLERGIES/MEDICATIONS      Allergies:        Coded Allergies:             LATEX (Verified  Allergy, Severe, SWELLING, 19)      Medications    Last Reconciled on 19 11:06 by SHRUTI LYNN      Montelukast Sodium (Montelukast*) 10 Mg Tablet      10 MG PO HS, TAB         Reported         19       Budesonide/Formoterol Fumarate (Symbicort  160/4.5 Mcg) 10.2 Gm Inh      2 PUFF INH RTBID, #1 INH 0 Refills         Reported         11/11/19       Lactobacillus Rhamnosus  (Probiotic Digestive Care) 1 Each Capsule      1 EACH PO, CAP         Reported         9/11/18       Albuterol (Proair HFA) 8.5 Gm Inh      1-2 PUFFS INH RTQ6H PRN for SHORTNESS OF BREATH, #1 INH 0 Refills         Reported         3/8/18       Escitalopram Oxalate (Escitalopram Oxalate*) 10 Mg Tablet      10 MG PO HS, TAB         Reported         3/8/18       Levothyroxine (Levothyroxine) 0.088 Mg Tablet      0.088 MG PO QDAY, #30 TAB 0 Refills         Reported         3/8/18      Current Medications      Current Medications Reviewed 11/11/19            EXAM      Vital Signs Reviewed      Gen: WDWN, Alert, NAD.        HEENT:  PERRL, EOMI.  OP, nares clear, no sinus tenderness.      Neck:  Supple, no JVD, no thyromegaly.      Lymph: No axillary, cervical, supraclavicular lymphadenopathy noted bilaterally.      Chest: Mildly decreased breath sounds throughout, no wheezes, rhonchi or     crackles, normal work of breathing noted.        CV:  RRR, no MGR, pulses 2+, equal.      Abd:  Soft, NT, ND, + BS, no HSM.      EXT:  No clubbing, no cyanosis, no edema, no joint tenderness.       Neuro:  A  Skin: No rashes or lesions.      Vtials      Vitals:             Height 63 in / 160.02 cm           Weight 147 lbs  / 66.626936 kg           BSA 1.70 m2           BMI 26.0 kg/m2           Temperature 97.9 F / 36.61 C - Oral           Pulse 62           Respirations 14           Blood Pressure 119/53 Sitting, Right Arm           Pulse Oximetry 93%, room air            REVIEW      Results Reviewed      P[facility] Results Reviewed?:  Yes Prev Lab Results, Yes Prev Radiology Results, Yes     Previous [facility] Records            Assessment      SOB (shortness of breath) - R06.02            Cough - R05            Notes      New Medications      * Budesonide/Formoterol Fumarate (Symbicort 160/4.5 Mcg) 10.2 GM  INH: 2 PUFF INH      RTBID #1      * MONTELUKAST SODIUM (Montelukast*) 10 MG TABLET: 10 MG PO HS      * predniSONE* 10 MG TABLET: 10 MG PO ASDIR #48         Instructions: 60mg x 3days, 40mg x 3days, 30mg x 3days, 20mg x 3days, 10mg x        3days         Dx: SOB (shortness of breath) - R06.02      * Amoxicillin/Clavulanic Acid 875/125 (Augmentin 875/125) 1 EACH TABLET: 875 MG       PO BID 7 Days #14         Dx: SOB (shortness of breath) - R06.02      New Diagnostics      * Chest 2 View, As Soon As Possible         Dx: SOB (shortness of breath) - R06.02      * Sputum Culture W/Gram Stain, As Soon As Possible         Dx: Cough - R05      ASSESSMENT:       1. 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      2.  Cough.       3. Wheezing.       4. Dyspnea.      5. History of asthma.       6. Pulmonary nodule 5 mm in right upper lobe due for follow up  CT scan of the     chest in December 2019.       7. Medical noncompliance.              PLAN:      1. I have discussed with the patient that I would like her to have a chest x-ray    done today for further evaluation to evaluate for recurrence of pneumonia.     Pending chest x-ray results we may need to move up her CT scan of the chest from    mid December for further evaluation. That CT scan of the chest was already     scheduled for follow up of 5 mm right upper lobe pulmonary nodule. At this time     the patient seems to be having recurrent symptoms of pneumonia and I will treat     her with Augmentin and start her on a 2 week tapered course of prednisone. She     is very concerned about being on oral prednisone and wants to minimize this as     much as possible. I have discussed with her that if her symptoms are not r    esolving or worsen after she finishes this short tapered course she may need a     longer tapered course.       2. I will give her 3% saline nebulizer in the  office today to try to induce a     sputum culture. Antibiotics therapy can be tailored as appropriate based on     sputum results.       3. Continue Symbicort 160 2 puffs twice daily and continue Singulair. I asked     her to hold off on starting allergy immunotherapy until we ensure she is     improving from her pneumonia.       4. I instructed her to follow up closely in our office in the next 2-3 weeks.     Apparently Dr. Marquez is hot available with appointment times so she can follow    up with me sooner if needed.            Patient Education      Patient Education Provided:  How to use an Inhaler      Time Spent:  > 50% /Coord Care            Electronically signed by LINDA ESTEVES PA-C  11/21/2019 12:54       Disclaimer: Converted document may not contain table formatting or lab diagrams. Please see [facility] for the authenticated document.

## 2021-05-28 NOTE — PROGRESS NOTES
Patient: TY MULLINS     Acct: [id number]     Report: #[id number]  UNIT #: [id number]     : 1960    Encounter Date:2018  PRIMARY CARE: XAVI LONG  ***Signed***  --------------------------------------------------------------------------------------------------------------------  Chief Complaint      Encounter Date      Sep 11, 2018            Primary Care Provider      XAVI LONG            Referring Provider      XAVI LONG            Patient Complaint      Patient is complaining of      left lung pain,new patient            VITALS      Height 5 ft 3 in / 160.02 cm      Weight 150 lbs 9 oz / 68.595675 kg      BSA 1.76 m2      BMI 26.7 kg/m2      Temperature 98.4 F / 36.89 C - Oral      Pulse 66      Respirations 16      Blood Pressure 102/50 Sitting, Right Arm      Pulse Oximetry 95%, room air      Exhaled Nitrous Oxide Testin            HPI      The patient is a 89 year old female who is otherwise very healthy and active.     She sees Dr. Long for her primary care. She started having shortness of breath    and chest pain a month and a half ago.  At that time it started with fever,     chills and chest pain which was bilateral, more on the right side but then she     said her chest pain shifted from the right side to the left. At that time she     had a chest x-ray performed which was suggestive of possible pneumonia. She was     given antibiotics beginning with the letter T. She was given 10 day course of     this. She did not feel better after taking it. CT scan of the chest was     performed showing some pleural effusion and signs concerning for congestive     heart failure. Because of that she went to [facility] emergency room. She was     seen there and was told it was double pneumonia and was put on antibiotics and     steroids. She took Erythromycin and Prednisone but did not have any improvement.    She saw Dr. Long and had a chest x-ray repeated which still  showed persistent     findings. Because of that she was given another course of antibiotics and     steroids with Erythromycin again. Chest x-ray was repeated on 08/30/18. It     showed persistent possible pleural effusion and maybe pneumonia. Because of that    she was sent to radiology for possible thoracentesis.  The radiologist looked at    with ultrasound and they did not see a big pocket to do a thoracentesis so it     was not done. She has an echocardiogram ordered for this afternoon.   She was     referred to us for persistent chest pain and shortness of breath.             She says she is short of breath when she lies flat. She does not have si    gnificant leg swelling. She has exertional dyspnea.  She used to be able to run     half marathons but she is not able to do it now. She is short of breath walking     long distances. She has no fevers or chills, no nausea and vomiting. When all     her symptoms started she had a temperature of 102 degree Fahrenheit. She has a     significant family history of coronary artery disease in her parents who had     coronary artery bypass graft in the 80's. She also has history of lung     transplant on her paternal side. Her brother has a diagnosis of cardiomyopathy.     She says her symptoms are worse at night when she tries to lie flat.            ROS      Constitutional:  Complains of: Fatigue; Denies: Fever, Weight gain, Weight loss,    Chills, Insomnia, Other      Respiratory/Breathing:  Complains of: Shortness of air, Pleuritic pain; Denies:     Wheezing, Cough, Hemoptysis, Other      Endocrine:  Denies: Polydipsia, Polyuria, Heat/cold intolerance, Abnorml     menstrual pattern, Diabetes, Other      Eyes:  Denies: Blurred vision, Vision Changes, Other      Ears, nose, mouth, throat:  Denies: Mouth lesions, Thrush, Throat pain,     Hoarseness, Allergies/Hay Fever, Post Nasal Drip, Headaches, Recent Head Injury,    Nose Bleeding, Neck Stiffness, Thyroid Mass,  Hearing Loss, Ear Fullness, Dry     Mouth, Nasal or Sinus Pain, Dry Lips, Nasal discharge, Nasal congestion, Other      Cardiovascular:  Denies: Palpitations, Syncope, Claudication, Chest Pain, Wake     up Gasping for air, Leg Swelling, Irregular Heart Rate, Cyanosis, Dyspnea on     Exertion, Other      Gastrointestinal:  Denies: Nausea, Constipation, Diarrhea, Abdominal pain,     Vomiting, Difficulty Swallowing, Reflux/Heartburn, Dysphagia, Jaundice, Bloat    ing, Melena, Bloody stools, Other      Genitourinary:  Denies: Urinary frequency, Incontinence, Hematuria, Urgency,     Nocturia, Dysuria, Testicular problems, Other      Musculoskeletal:  Denies: Joint Pain, Joint Stiffness, Joint Swelling, Myalgias,    Other      Hematologic/lymphatic:  DENIES: Lymphadenopathy, Bruising, Bleeding tendencies,     Other      Neurological:  Denies: Headache, Numbness, Weakness, Seizures, Other      Psychiatric:  Denies: Anxiety, Appropriate Effect, Depression, Other      Sleep:  No: Excessive daytime sleep, Morning Headache?, Snoring, Insomnia?, Stop    breathing at sleep?, Other      Integumentary:  Denies: Rash, Dry skin, Skin Warm to Touch, Other      Immunologic/Allergic:  Denies: Latex allergy, Seasonal allergies, Asthma,     Urticaria, Eczema, Other      Immunization status:  No: Up to date            FAMILY/SOCIAL/MEDICAL HX      Surgical History:  No: AAA Repair, Abdominal Surgery, Adenoids, Angioplasty,     Appendectomy, Back Surgery, Bladder Surgery, Bowel Surgery, Breast Surgery,     CABG, Carotid Stenosis, Cholecystectomy, Ear Surgery, Eye Surgery, Head Surgery,    Hernia Surgery, Kidney Surgery, Nose Surgery, Oral Surgery, Orthopedic Surgery,     Prostatectomy, Rectal Surgery, Spinal Surgery, Testicular Surgery, Throat     Surgery, Tonsils, Valve Replacement, Vascular Surgery, Other Surgeries      Heart - Family Hx:  Mother, Father, Brother      Cancer/Type - Family Hx:  Sister      Is Father Still Living?:  No       Is Mother Still Living?:  No       Family History:  Yes      Social History:  No Tobacco Use, No Alcohol Use, No Recreational Drug use      Smoking status:  Never smoker       Section:  Yes      Anticoagulation Therapy:  No      Antibiotic Prophylaxis:  No      Medical History:  Yes: Asthma (MILD), Hemorrhoids/Rectal Prob (REFLUX), High     Cholesterol, Shortness Of Breath, Thyroid Problem (hypo); No: Alcoholism,     Allergies, Anemia, Arthritis, Atrial Fibrillation, Blood Disease, Broken Bones,     Cataracts, Chemical Dependency, Chemotherapy/Cancer, Chronic Bronchitis/COPD,     Emphysema, Chronic Liver Disease, Colon Trouble, Colitis, Diverticulitis,     Congestive Heart Failu, Deafness or Ringing Ears, Convulsions, Depression,     Anxiety, Bipolar Disorder, PTSD, Diabetes, Epilepsy, Seizures, Forgetfullness,     Glaucoma, Gall Stones, Gout, Head Injury, Heart Attack, Heart Murmur, GERD,     Hepatitis, Hiatal Hernia, High Blood Pressure, HIV (Do not ask - volu, Jaundice,    Kidney or Bladder Disease, Kidney Stones, Migrane Headaches, Mitral Valve     Prolapse, Night sweats, Phlebitis, Psychiatric Care, Reflux Disease, Rheumatic     Fever, Sexually Transmitted Dis, Sinus Trouble, Skin Disease/Psoriais/Ecz,     Stroke, Tuberculosis or Pos TB Te, Miscellaneous Medical/oth      Psychiatric History      none            PREVENTION      Hx Influenza Vaccination:  No      Influenza Vaccine Declined:  Yes      2 or More Falls Past Year?:  No      Fall Past Year with Injury?:  No      Hx Pneumococcal Vaccination:  No      Encouraged to follow-up with:  PCP regarding preventative exams.      Chart initiated by      jes rivera ma            ALLERGIES/MEDICATIONS      Allergies:        Coded Allergies:             LATEX (Verified  Allergy, Severe, SWELLING, 18)      Medications    Last Reconciled on 18 09:09 by TRACI CASTILLO MD      Hydrocodone/Acetaminophen 5/325 MG (Hydrocodone/Acetaminophen 5/325  MG) 1 Each     Tablet      2 TAB PO Q6H PRN for PAIN, TAB         Reported         9/11/18       Naproxen Sodium (Aleve) 220 Mg Tablet      220 MG PO Q8H PRN for BACK PAIN, #100 TAB 0 Refills         Reported         9/11/18       Lactobacillus Rhamnosus  (Probiotic Digestive Care) 1 Each Capsule      1 EACH PO, CAP         Reported         9/11/18       Albuterol (Proair HFA*) 8.5 Gm Inh      1-2 PUFFS INH RTQ6H PRN for SHORTNESS OF BREATH, #1 INH 0 Refills         Reported         3/8/18       Escitalopram Oxalate (Escitalopram Oxalate*) 10 Mg Tablet      10 MG PO HS, TAB         Reported         3/8/18       Levothyroxine (Levothyroxine) 0.088 Mg Tablet      0.088 MG PO QDAY, #30 TAB 0 Refills         Reported         3/8/18      Current Medications      Current Medications Reviewed 9/11/18            EXAM      CONSTITUTIONAL: Pleasant female in no acute distress,  normal conversant.       EYES : Pink conjunctive, no ptosis, PERRL.       ENMT : Nose and ears appear normal, normal dentition, mild posterior pharyngeal     wall erythema. Mallampati classification II, no sinus tenderness.        Neck: Nontender, no masses, no thyromegaly, no nodules.      Resp : Bilateral diminished breath sounds at bases, some scattered rales,      resonant to percussion bilaterally, no wheezing or rhonchi.      CVS  : No carotid bruits, s1s2 nl, RRR, no murmur, rubs or gallop, no peripheral    edema       Chest wall: Normal rise with inspiration, nontender on palpation      GI   : Abdomen soft, with no masses, no hepatosplenomegaly, no hernias, BS+      MSK  : Normal gait and station, no digital cyanosis or clubbing       Skin : No rashes, ulcerations or lesions, normal turgor and temperature      Neuro: CN II - XII intact, no sensory deficits, DTRs intact and symmetrical, no     motor weakness      Psych: Appropriate affect, A   Vtials      Vitals:             Height 5 ft 3 in / 160.02 cm           Weight 150 lbs 9 oz /  68.626544 kg           BSA 1.76 m2           BMI 26.7 kg/m2           Temperature 98.4 F / 36.89 C - Oral           Pulse 66           Respirations 16           Blood Pressure 102/50 Sitting, Right Arm           Pulse Oximetry 95%, room air            REVIEW      Results Reviewed      PCCS Results Reviewed?:  Yes Prev Lab Results, Yes Prev Radiology Results, Yes     Previous M[facility] Records      Lab Results      The patient's blood work from 18 showed normal white blood count count and    normal hemoglobin.   The patient's FENO was 26 today signifying possible     intermediate control of eosinophilic airway inflammation.      Radiographic Results               [facility]                PACS RADIOLOGY REPORT            Patient: TY MULLINS   Acct: #[id number]   Report: #[id number]            UNIT #: [id number]    DOS: 18 1552      INSURANCE:[employer] - [employer]   ORDER #:[id number]      LOCATION:[facility]     : 1960            PROVIDERS      ADMITTING:     ATTENDING: XAVI LONG      FAMILY:  XAVI LONG   ORDERING:  XAVI LONG         OTHER:    DICTATING:  PHIL HAGAN MD            REQ #:[id number]   EXAM:CXR2 - CHEST 2V AP PA LAT      REASON FOR EXAM:  J90      REASON FOR VISIT:  J90            *******Signed******         PROCEDURE:   CHEST AP/PA AND LATERAL             COMPARISON:   [facility], CR, CHEST DECUBITUS 1 VIEW RIGHT,     2018, 16:16.        [facility], CR, CHEST PA/AP          INDICATIONS:   Pleurisy, Shortness of air, intermittent chest pain             FINDINGS:         Heart size is stable.  Stable size of a right pleural effusion.  Stable     bibasilar opacity.  No       visible pneumothorax.             CONCLUSION:   No significant change from yesterday              PHIL HAGAN MD             Electronically Signed and Approved By: PHIL HAGAN MD on 2018 at  16:36                        Until signed, this is an unconfirmed preliminary report that may contain      errors and is subject to change.                                              DOWER:      D:18 1636                     [facility]                   [facility]                PACS RADIOLOGY REPORT            Patient: TY MULLINS   Acct: #[id number]   Report: #[id number]            UNIT #: [id number]    DOS: 18 1615      INSURANCE:[employer] - [facility]   ORDER #:[id number]      LOCATION:[facility]     : 1960            PROVIDERS      ADMITTING:     ATTENDING: XAVI LONG      FAMILY:  XAVI LONG   ORDERING:  XAVI LONG         OTHER:    DICTATING:  Rk Guzman MD, IV            REQ #:[id number]   EXAM:CHW - CT CHEST with CONTRAST      REASON FOR EXAM:        REASON FOR VISIT:  ABN CXR            *******Signed******         PROCEDURE:   CT CHEST W/ CONTRAST             COMPARISON:   [facility], , CHEST PA/AP   16:47.             INDICATIONS:   CHEST PAIN ON DEEP INSPIRATIO AND WHEN SUPINE, SOA X 4 DAYS. NON     SMOKER. GFR 61       CREATININE 1.0  18.             PROTOCOL:     Pulmonary embolism imaging protocol performed                RADIATION:     DLP: 294.6mGy*cm          Automated exposure control was utilized to minimize radiation dose.       CONTRAST:   100cc Isovue 370 I.V.             LABS:     eGFR: 61ml/min/1.73m2             TECHNIQUE:   Axial images of the chest with intravenous contrast.             FINDINGS:      No pulmonary emboli are identified.  The heart is enlarged.  There are areas a     ground-glass opacity       in the mid and upper lung fields likely representing pulmonary edema.  Airspace     consolidation is       present in the mid and lower lung fields.  There are small/moderate bilateral     pleural effusions       right greater than left.  The thoracic aorta has a normal caliber.  There are no     enlarged       mediastinal, axillary or hilar lymph nodes.  Images of the upper abdomen are     unremarkable.  No       evidence of significant Coronary artery calcification.             IMPRESSION:              1.  No pulmonary emboli are identified.             2.  Findings suggest congestive heart failure with small/moderate bilateral     pleural effusions.             3.  Airspace consolidation in the mid and lower lung fields may be secondary to     atelectasis or       pneumonia.              JEIMY VALDEZ MD             Electronically Signed and Approved By: JEIMY VALDEZ MD on 8/08/2018 at 17:09                           Until signed, this is an unconfirmed preliminary report that may contain      errors and is subject to change.                                              BRYJE:      D:08/08/18 1709            Assessment      Congestive heart failure         Congestive heart failure, unspecified HF chronicity, unspecified heart        failure type - I50.9         Heart failure type: unspecified         Heart failure chronicity: unspecified            Pneumonia         Aspiration pneumonia of both lower lobes, unspecified aspiration pneumonia        type - J69.0         Aspiration pneumonia type: unspecified         Laterality: bilateral         Lung location: lower lobe of lung            Notes      New Medications      * Lactobacillus Rhamnosus  (Probiotic Digestive Care) 1 EACH CAPSULE: 1       EACH PO      * NAPROXEN SODIUM (Aleve) 220 MG TABLET: 220 MG PO Q8H PRN BACK PAIN #100      * Hydrocodone/Acetaminophen 5/325 MG 1 EACH TABLET: 2 TAB PO Q6H PRN PAIN      New Diagnostics      * Probrain Natriuretic, Routine         Dx: Congestive heart failure - I50.9      * Sed Rate, Routine         Dx: Congestive heart failure - I50.9      * CRP High Sensitive, Routine         Dx: Pneumonia - J18.9      PLAN:      The patient is av 58 year old female with persistent symptoms of exertional      dyspnea, likely orthopnea and CT scan showing bilateral consolidations and     pleural effusions. Differential diagnoses at this time include pneumonia versus     cardiomyopathy versus myocarditis and pleurisy. We discussed the possible work     up.             1. I will check an echocardiogram now. I will check BNP, ESR,  SED rate and CRP.          2. I discussed bronchoscopy with her and thoracentesis procedures were discussed    in detail. Risks and benefits of these procedures were discussed in detail.     Risks including pneumonia and pneumothorax, bleeding, respiratory depression and    that it could be fatal were all discussed. Alternatives and options were also     discussed. She is agreeable for the procedure. I will schedule the bronchoscopy     and possible thoracentesis with Dr. Harmon for tomorrow.       3. I will follow up with her next week.            Patient Education      Education resources provided:  Yes      Patient Education Provided:  Acute Bronchitis            Procedure Orders      Get Consent signed for:        Bronchoscopy with bronchoalveolar lavage, transbronchial biopsies,      bronchial washing, bronchial brushing, airway inspection.      Risks and Benefits:        Risks and benefits were discussed in detail. The patient understands the      risks including pneumothorax, pneumonia, respiratory depression, bleeding      and that it could be fatal as well. The patient is agreeable for the      procedure. Alternatives and options were also discussed. The patient is      needs to be NPO for the procedure.                 Disclaimer: Converted document may not contain table formatting or lab diagrams. Please see [facility] for the authenticated document.

## 2021-05-28 NOTE — PROGRESS NOTES
Patient: TY MULLINS     Acct: [id number]     Report: #[id number]  UNIT #: [id number]     : 1960    Encounter Date:10/18/2018  PRIMARY CARE: XAVI LONG  ***Signed***  --------------------------------------------------------------------------------------------------------------------  Chief Complaint      Encounter Date      Oct 18, 2018            Primary Care Provider      XAVI LONG            Referring Provider      XAVI LONG            Patient Complaint      Patient is complaining of       3-4 Weeks [facility] Discharge, asthma            VITALS      Height 5 ft 3 in / 160.02 cm      Weight 155 lbs 7 oz / 70.817024 kg      BSA 1.74 m2      BMI 27.5 kg/m2      Temperature 97.7 F / 36.5 C - Oral      Pulse 71      Respirations 14      Blood Pressure 143/73 Sitting, Left Arm      Pulse Oximetry 95%, room air      Exhaled Nitrous Oxide Testin            HPI      The patient is a very pleasant 58 year old white female who is a patient of Dr. Marquez's who I have also seen on 18. At that time she was diagnosed with     pneumonia after having significant dyspnea and failing multiple rounds of     treatment with antibiotics and steroids. She underwent bronchoscopy and all     cultures were negative and have remained negative but her transbronchial lung     biopsy showed nonnecrotizing granulomatous inflammation. There was concern for     eosinophilic pneumonia or cryptogenic organism and I started her on a 2 week     course of Prednisone and she had significant improvement of her dyspnea and     coughing. She followed up with Dr. Marquez several weeks ago and the plan was     for her to do a slow tapered course of Prednisone starting at 50 mg and     decreasing by 10 mg every 2 weeks until she is off it all together. At her last     office visit it appeared she was having an enlarging left pleural effusion. This    was to be drained by Dr. Peterson but there was not enough fluid to  drain when she     saw him at [facility] and underwent ultrasound. She currently says    she is feeling much better and her dyspnea and cough are improved. She denies     orthopnea, lower extremity edema or abdominal distension. She denies weight     gain, hemoptysis, fevers or chills. Her complaint today is that she feels like     the steroids make her hungry and she is eating more and feels puffy in her face.    She is also having some difficulty sleeping while on Prednisone and started     taking ZzzQuil which is helping. She notices now that she is down to 30 mg of     Prednisone her symptoms are improving and she is able to sleep better.             I have reviewed his Review of Systems medical, surgical and family history and     agree with those as entered.            ROS      Constitutional:  Denies: Fatigue, Fever, Weight gain, Weight loss, Chills,     Insomnia, Other      Respiratory/Breathing:  Complains of: Shortness of air; Denies: Wheezing, Cough,    Hemoptysis, Pleuritic pain, Other      Endocrine:  Denies: Polydipsia, Polyuria, Heat/cold intolerance, Abnorml     menstrual pattern, Diabetes, Other      Eyes:  Denies: Blurred vision, Vision Changes, Other      Ears, nose, mouth, throat:  Denies: Mouth lesions, Thrush, Throat pain,     Hoarseness, Allergies/Hay Fever, Post Nasal Drip, Headaches, Recent Head Injury,    Nose Bleeding, Neck Stiffness, Thyroid Mass, Hearing Loss, Ear Fullness, Dry     Mouth, Nasal or Sinus Pain, Dry Lips, Nasal discharge, Nasal congestion, Other      Cardiovascular:  Denies: Palpitations, Syncope, Claudication, Chest Pain, Wake     up Gasping for air, Leg Swelling, Irregular Heart Rate, Cyanosis, Dyspnea on     Exertion, Other      Gastrointestinal:  Denies: Nausea, Constipation, Diarrhea, Abdominal pain,     Vomiting, Difficulty Swallowing, Reflux/Heartburn, Dysphagia, Jaundice,     Bloating, Melena, Bloody stools, Other      Genitourinary:  Denies: Urinary  frequency, Incontinence, Hematuria, Urgency,     Nocturia, Dysuria, Testicular problems, Other      Musculoskeletal:  Denies: Joint Pain, Joint Stiffness, Joint Swelling, Myalgias,    Other      Hematologic/lymphatic:  DENIES: Lymphadenopathy, Bruising, Bleeding tendencies,     Other      Neurological:  Denies: Headache, Numbness, Weakness, Seizures, Other      Psychiatric:  Denies: Anxiety, Appropriate Effect, Depression, Other      Sleep:  No: Excessive daytime sleep, Morning Headache?, Snoring, Insomnia?, Stop    breathing at sleep?, Other      Integumentary:  Denies: Rash, Dry skin, Skin Warm to Touch, Other      Immunologic/Allergic:  Denies: Latex allergy, Seasonal allergies, Asthma,     Urticaria, Eczema, Other      Immunization status:  No: Up to date            FAMILY/SOCIAL/MEDICAL HX      Surgical History:  No: AAA Repair, Abdominal Surgery, Adenoids, Angioplasty,     Appendectomy, Back Surgery, Bladder Surgery, Bowel Surgery, Breast Surgery,     CABG, Carotid Stenosis, Cholecystectomy, Ear Surgery, Eye Surgery, Head Surgery,    Hernia Surgery, Kidney Surgery, Nose Surgery, Oral Surgery, Orthopedic Surgery,     Prostatectomy, Rectal Surgery, Spinal Surgery, Testicular Surgery, Throat     Surgery, Tonsils, Valve Replacement, Vascular Surgery, Other Surgeries      Heart - Family Hx:  Mother, Father, Brother      Cancer/Type - Family Hx:  Sister      Is Father Still Living?:  No      Is Mother Still Living?:  No       Family History:  Yes      Social History:  No Tobacco Use, No Alcohol Use, No Recreational Drug use      Smoking status:  Never smoker       Section:  Yes      Anticoagulation Therapy:  No      Antibiotic Prophylaxis:  No      Medical History:  Yes: Asthma (MILD), Hemorrhoids/Rectal Prob (REFLUX, HIATAL     HERNIA), High Cholesterol, Shortness Of Breath (COUGH), Thyroid Problem (hypo);     No: Alcoholism, Allergies, Anemia, Arthritis, Atrial Fibrillation, Blood     Disease, Broken  Bones, Cataracts, Chemical Dependency, Chemotherapy/Cancer,     Chronic Bronchitis/COPD, Emphysema, Chronic Liver Disease, Colon Trouble,     Colitis, Diverticulitis, Congestive Heart Failu, Deafness or Ringing Ears,     Convulsions, Depression, Anxiety, Bipolar Disorder, PTSD, Diabetes, Epilepsy,     Seizures, Forgetfullness, Glaucoma, Gall Stones, Gout, Head Injury, Heart Attac    k, Heart Murmur, GERD, Hepatitis, Hiatal Hernia, High Blood Pressure, HIV (Do     not ask - volu, Jaundice, Kidney or Bladder Disease, Kidney Stones, Migrane     Headaches, Mitral Valve Prolapse, Night sweats, Phlebitis, Psychiatric Care,     Reflux Disease, Rheumatic Fever, Sexually Transmitted Dis, Sinus Trouble, Skin     Disease/Psoriais/Ecz, Stroke, Tuberculosis or Pos TB Te, Miscellaneous Med    ical/oth            PREVENTION      Hx Influenza Vaccination:  No      Influenza Vaccine Declined:  Yes      2 or More Falls Past Year?:  No      Fall Past Year with Injury?:  No      Hx Pneumococcal Vaccination:  No      Encouraged to follow-up with:  PCP regarding preventative exams.      Chart initiated by      chivo cottrell ma            ALLERGIES/MEDICATIONS      Allergies:        Coded Allergies:             LATEX (Verified  Allergy, Severe, SWELLING, 10/18/18)      Medications    Last Reconciled on 10/18/18 10:07 by SHRUTI LYNN      predniSONE* (Deltasone*) 10 Mg Tablet      10 MG PO ASDIR, #45 TAB 0 Refills         Prov: CAS ROMAN         9/25/18       predniSONE* (predniSONE*) 50 Mg Tablet      50 MG PO QDAY, #14 TAB         Prov: Catina Perry PA-C         9/17/18       Lactobacillus Rhamnosus  (Probiotic Digestive Care) 1 Each Capsule      1 EACH PO, CAP         Reported         9/11/18       Albuterol (Proair HFA*) 8.5 Gm Inh      1-2 PUFFS INH RTQ6H PRN for SHORTNESS OF BREATH, #1 INH 0 Refills         Reported         3/8/18       Escitalopram Oxalate (Escitalopram Oxalate*) 10 Mg Tablet      10 MG PO HS, TAB          Reported         3/8/18       Levothyroxine (Levothyroxine) 0.088 Mg Tablet      0.088 MG PO QDAY, #30 TAB 0 Refills         Reported         3/8/18      Current Medications      Current Medications Reviewed 10/18/18            EXAM      CONSTITUTIONAL: Pleasant  normal conversant.       EYES : Pink conjunctive, no ptosis, PERRL.       ENMT : Nose and ears appear normal, normal dentition, mild posterior pharyngeal     wall erythema. Mallampati classification       Neck: Nontender, no masses, no thyromegaly, no nodules.      Resp : Grossly clear to auscultation bilaterally without wheezes, rhonchi or     crackles appreciated.  Normal work of breathing noted.       CVS  : No carotid bruits, s1s2 nl, RRR, no murmur, rubs or gallop, no peripheral    edema       Chest wall: Normal rise with inspiration, nontender on palpation      GI   : Abdomen soft, with no masses, no hepatosplenomegaly, no hernias, BS+      MSK  : Normal gait and station, no digital cyanosis or clubbing       Skin : No rashes, ulcerations or lesions, normal turgor and temperature      Neuro: CN II - XII intact, no sensory deficits, DTRs intact and symmetrical, no     motor weakness      Psych: Appropriate affect, A   Vtials      Vitals:             Height 5 ft 3 in / 160.02 cm           Weight 155 lbs 7 oz / 70.194005 kg           BSA 1.74 m2           BMI 27.5 kg/m2           Temperature 97.7 F / 36.5 C - Oral           Pulse 71           Respirations 14           Blood Pressure 143/73 Sitting, Left Arm           Pulse Oximetry 95%, room air            REVIEW      Results Reviewed      PCCS Results Reviewed?:  Yes Prev Lab Results, Yes Prev Radiology Results, Yes     Previous [facility] Records      Radiographic Results               [facility] [place], [place]                PACS RADIOLOGY REPORT            Patient: TY MULLINS   Acct: #[id number]   Report: #[id number]            UNIT #:  [id number]    DOS: 10/17/18 1444      INS[employer]   ORDER #:[id number]      LOCATION:[facility]     : 1960            PROVIDERS      ADMITTING:     ATTENDING: Donaldo Peterson      FAMILY:  XAVI LONG   ORDERING:  Donaldo Peterson         OTHER:    DICTATING:  GALEN HERNANDEZ MD            REQ #:[id number]   EXAM:CXR2 - CHEST 2V AP PA LAT      REASON FOR EXAM:        REASON FOR VISIT:  PLURAL EFFUSION            *******Signed******         PROCEDURE:   CHEST AP/PA AND LATERAL             COMPARISON:   [facility], , CHEST AP/PA 1 VIEW, 2018,     11:01.             INDICATIONS:   PLEURAL EFFUSION. PATIENT STATES IMPROVED SOA.             FINDINGS:         There are stable parenchymal linear opacities in the right middle lobe     anteriorly and in the left       lower lobe.  These areas were shown on CT to be possibly atelectasis and there     bandlike appearance       would favor that diagnosis.  There are no new focal infiltrates.             CONCLUSION:         1. Bilateral discoid opacities as before.  Favor atelectasis.              GALEN HERNANDEZ MD             Electronically Signed and Approved By: GALEN HERNANDEZ MD on 10/17/2018 at 15:13                        Until signed, this is an unconfirmed preliminary report that may contain      errors and is subject to change.                                              ARON:      D:10/17/18 1513            Assessment      ASSESSMENT:       1. Dyspnea on exertion, improving.      2. Right pleural effusion, resolving.      3. Left pleural effusion, resolving.        4. 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      5. History of asthma that seems to be well controlled.             PLAN:      1.  The patient is responding well to steroids and her symptoms are improving.     Continue on her slow Prednisone taper. I have recommended that she continue to     use ZzzQuil or  melatonin over the counter to help her sleep. She is having an     easier time sleeping now that her Prednisone dose is weaning down.       2. She is due for a repeat chest CT scan in about a month and then follow up     with Dr. Peterson or Dr. Marquez.        3. She is to call the office if she has any worsening symptoms or change in her     symptoms such as worsening dyspnea, orthopnea, coughing or wheezing.            Patient Education      Time Spent:  > 50% /[facility]                 Disclaimer: Converted document may not contain table formatting or lab diagrams. Please see [employer] for the authenticated document.

## 2021-05-28 NOTE — PROGRESS NOTES
Patient: TY MULLINS     Acct: [id number]     Report: #[id number]  UNIT #: [id number]     : 1960    Encounter Date:2019  PRIMARY CARE: XAVI LONG  ***Signed***  --------------------------------------------------------------------------------------------------------------------  Chief Complaint      Encounter Date      Aug 9, 2019            Primary Care Provider      XAVI LONG            Referring Provider      XAVI LONG            Patient Complaint      Patient is complaining of      Patient here today for follow up on Pneumonia            VITALS      Height 63 in / 160.02 cm      Weight 143 lbs  / 64.904088 kg      BSA 1.68 m2      BMI 25.3 kg/m2      Temperature 98.3 F / 36.83 C - Oral      Pulse 60      Respirations 14      Blood Pressure 139/74 Sitting, Right Arm      Pulse Oximetry 97%, room air      Initial Exhaled Nitrous Oxide      Exhaled Nitrous Oxide Results:  26            HPI      The patient is a very pleasant 59 year old white female last seen by me in the     office in 2018.  She is a patient of Dr. Marquez's and has history of     pneumonia about 1 year ago with dyspnea, failed multiple rounds of antibiotics     and steroids. She ended up undergoing bronchoscopy but all bronchalveolar lavage    cultures were negative. 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  She took a slow tapered course of prednisone     in 2018 and had significant improvement and resolution of her symptoms. She    had a follow up CT scan of the chest in 2018 that showed resolution of     previous pneumonia. There was a 5 mm right upper lobe seen with recommended 1     year follow up. The patient has been lost to follow up in our office since     2018 but 1 month ago she began having increased dyspnea, fatigue,     general malaise and  not feeling well and saw her primary care provider Dr. Davis. He did a chest x-ray on 07/25/19 showing a new patchy right lower lobe     airspace disease felt to be secondary to atelectasis or pneumonia. He treated     her with a course of antibiotics. The patient is not sure the name of the     antibiotics but she has completed it. He also started her on a slow prednisone     taper, she is taking 40 mg of prednisone daily for 2 weeks and is starting on 30    mg prednisone daily this week. She was told to do a slow taper decreasing by 10     mg every 2 weeks. After completing antibiotics and 2 weeks of prednisone the     patient is feeling significantly improved but not yet back to her baseline. She     still has some dyspnea, some pleuritic type chest pain.  She denies coughing,     wheezing, hemoptysis, fever or chills.             I reviewed her Review of Systems, medical, surgical and family history and agree    with those as entered.      Copies To:   CAS ROMAN      Constitutional:  Complains of: Weight gain; Denies: Fatigue, Fever, Weight loss,    Chills, Insomnia, Other      Respiratory/Breathing:  Complains of: Shortness of air; Denies: Wheezing, Cough,    Hemoptysis, Pleuritic pain, Other      Endocrine:  Denies: Polydipsia, Polyuria, Heat/cold intolerance, Abnorml     menstrual pattern, Diabetes, Other      Eyes:  Denies: Blurred vision, Vision Changes, Other      Ears, nose, mouth, throat:  Denies: Mouth lesions, Thrush, Throat pain,     Hoarseness, Allergies/Hay Fever, Post Nasal Drip, Headaches, Recent Head Injury,    Nose Bleeding, Neck Stiffness, Thyroid Mass, Hearing Loss, Ear Fullness, Dry     Mouth, Nasal or Sinus Pain, Dry Lips, Nasal discharge, Nasal congestion, Other      Cardiovascular:  Denies: Palpitations, Syncope, Claudication, Chest Pain, Wake     up Gasping for air, Leg Swelling, Irregular Heart Rate, Cyanosis, Dyspnea on     Exertion, Other       Gastrointestinal:  Denies: Nausea, Constipation, Diarrhea, Abdominal pain,     Vomiting, Difficulty Swallowing, Reflux/Heartburn, Dysphagia, Jaundice,     Bloating, Melena, Bloody stools, Other      Genitourinary:  Denies: Urinary frequency, Incontinence, Hematuria, Urgency,     Nocturia, Dysuria, Testicular problems, Other      Musculoskeletal:  Denies: Joint Pain, Joint Stiffness, Joint Swelling, Myalgias,    Other      Hematologic/lymphatic:  DENIES: Lymphadenopathy, Bruising, Bleeding tendencies,     Other      Neurological:  Denies: Headache, Numbness, Weakness, Seizures, Other      Psychiatric:  Denies: Anxiety, Appropriate Effect, Depression, Other      Sleep:  No: Excessive daytime sleep, Morning Headache?, Snoring, Insomnia?, Stop    breathing at sleep?, Other      Integumentary:  Denies: Rash, Dry skin, Skin Warm to Touch, Other      Immunologic/Allergic:  Denies: Latex allergy, Seasonal allergies, Asthma,     Urticaria, Eczema, Other      Immunization status:  No: Up to date            FAMILY/SOCIAL/MEDICAL HX      Surgical History:  No: AAA Repair, Abdominal Surgery, Adenoids, Angioplasty,     Appendectomy, Back Surgery, Bladder Surgery, Bowel Surgery, Breast Surgery,     CABG, Carotid Stenosis, Cholecystectomy, Ear Surgery, Eye Surgery, Head Surgery,    Hernia Surgery, Kidney Surgery, Nose Surgery, Oral Surgery, Orthopedic Surgery,     Prostatectomy, Rectal Surgery, Spinal Surgery, Testicular Surgery, Throat     Surgery, Tonsils, Valve Replacement, Vascular Surgery, Other Surgeries      Heart - Family Hx:  Mother, Father, Brother      Cancer/Type - Family Hx:  Sister      Is Father Still Living?:  No      Is Mother Still Living?:  No       Family History:  Yes      Social History:  No Tobacco Use, No Alcohol Use, No Recreational Drug use      Smoking status:  Never smoker       Section:  Yes      Anticoagulation Therapy:  No      Antibiotic Prophylaxis:  No      Medical History:  Yes: Asthma  (MILD), Hemorrhoids/Rectal Prob (REFLUX, HIATAL     HERNIA), High Cholesterol, Shortness Of Breath (COUGH), Thyroid Problem (hypo);     No: Alcoholism, Allergies, Anemia, Arthritis, Atrial Fibrillation, Blood     Disease, Broken Bones, Cataracts, Chemical Dependency, Chemotherapy/Cancer,     Chronic Bronchitis/COPD, Emphysema, Chronic Liver Disease, Colon Trouble,     Colitis, Diverticulitis, Congestive Heart Failu, Deafness or Ringing Ears,     Convulsions, Depression, Anxiety, Bipolar Disorder, PTSD, Diabetes, Epilepsy,     Seizures, Forgetfullness, Glaucoma, Gall Stones, Gout, Head Injury, Heart     Attack, Heart Murmur, GERD, Hepatitis, Hiatal Hernia, High Blood Pressure, HIV     (Do not ask - volu, Jaundice, Kidney or Bladder Disease, Kidney Stones, Migrane     Headaches, Mitral Valve Prolapse, Night sweats, Phlebitis, Psychiatric Care,     Reflux Disease, Rheumatic Fever, Sexually Transmitted Dis, Sinus Trouble, Skin     Disease/Psoriais/Ecz, Stroke, Tuberculosis or Pos TB Te, Miscellaneous     Medical/oth      Psychiatric History      none            PREVENTION      Hx Influenza Vaccination:  No      Influenza Vaccine Declined:  Yes      2 or More Falls Past Year?:  No      Fall Past Year with Injury?:  No      Hx Pneumococcal Vaccination:  No      Encouraged to follow-up with:  PCP regarding preventative exams.      Chart initiated by      Chiqui Frey CMA            ALLERGIES/MEDICATIONS      Allergies:        Coded Allergies:             LATEX (Verified  Allergy, Severe, SWELLING, 8/9/19)      Medications    Last Reconciled on 8/9/19 08:26 by SHRUTI LYNN      predniSONE* (predniSONE*) 20 Mg Tablet      40 MG PO QDAY, TAB 0 Refills         Reported         8/9/19       Lactobacillus Rhamnosus  (Probiotic Digestive Care) 1 Each Capsule      1 EACH PO, CAP         Reported         9/11/18       Albuterol (Proair HFA) 8.5 Gm Inh      1-2 PUFFS INH RTQ6H PRN for SHORTNESS OF BREATH, #1 INH 0  Refills         Reported         3/8/18       Escitalopram Oxalate (Escitalopram Oxalate*) 10 Mg Tablet      10 MG PO HS, TAB         Reported         3/8/18       Levothyroxine (Levothyroxine) 0.088 Mg Tablet      0.088 MG PO QDAY, #30 TAB 0 Refills         Reported         3/8/18      Current Medications      Current Medications Reviewed 19            EXAM      Vital Signs Reviewed      Gen: WDWN, Alert, NAD.        HEENT:  PERRL, EOMI.  OP, nares clear, no sinus tenderness.      Neck:  Supple, no JVD, no thyromegaly.      Lymph: No axillary, cervical, supraclavicular lymphadenopathy noted bilaterally.      Chest: Grossly clear to auscultation, no wheezes, rhonchi or crackles     appreciated, normal work of breathing noted.        CV:  RRR, no MGR, pulses 2+, equal.      Abd:  Soft, NT, ND, + BS, no HSM.      EXT:  No clubbing, no cyanosis, no edema, no joint tenderness.       Neuro:  A  Skin: No rashes or lesions.      Vtials      Vitals:             Height 63 in / 160.02 cm           Weight 143 lbs  / 64.827918 kg           BSA 1.68 m2           BMI 25.3 kg/m2           Temperature 98.3 F / 36.83 C - Oral           Pulse 60           Respirations 14           Blood Pressure 139/74 Sitting, Right Arm           Pulse Oximetry 97%, room air            REVIEW      Results Reviewed      PCCS Results Reviewed?:  Yes Prev Lab Results, Yes Prev Radiology Results, Yes     [facility]      [facility] Results               [facility]                PACS RADIOLOGY REPORT            Patient: TY MULLINS   Acct: #[id number]   Report: #[id number]            UNIT #: [id number]    DOS: 18 0745      INSURANCE:[email or web address] - [employer]   ORDER #:[id number]      LOCATION:[facility]     : 1960            PROVIDERS      ADMITTING:     ATTENDING: CAS ROMAN      FAMILY:  XAVI LONG   ORDERING:  Donaldo Peterson         OTHER:    DICTATING:  JOYCE  DENIZ BROWN            REQ #:[id number]   EXAM:WO - CT CHEST without CONTRAST      REASON FOR EXAM:        REASON FOR VISIT:  COUGH            *******Signed******         PROCEDURE:   CT CHEST WITHOUT CONTRAST             COMPARISON:   [facility], CT, CHEST W/O CONTRAST,     9/21/2018, 15:58.             INDICATIONS:   PREV PNEUMONIA AND PLUERAL EFFUSIONS, LUNG NODULE, SEEN ON     PREVIOUS SCAN,F/U             TECHNIQUE:   CT images were created without the administration of contrast     material.               PROTOCOL:     Standard imaging protocol performed                RADIATION:     DLP: 244.2mGy*cm          Automated exposure control was utilized to minimize radiation dose.              FINDINGS:         The soft tissue structures at the base and neck are within normal limits.  There    is no lower       cervical or axillary adenopathy.  The heart size is normal.  There is no     pericardial effusion.        There is trace aortic and Coronary artery atherosclerotic calcification.  The     aorta and main       pulmonary artery are normal in caliber.  He there are unchanged reactive     mediastinal and hilar       lymph nodes.  There is an unchanged 1 cm short axis a low right paratracheal lym    ph node on image       31. There are calcified right hilar granulomas.             The tracheobronchial tree is patent.  There is no abnormal bronchial wall     thickening or       bronchiectasis.  The previously seen pleural effusions have resolved.  There is     similar bandlike       consolidation and atelectasis within the right middle lobe.  The consolidations     within the       bilateral lower lobes have improved.  There is a focus of probable rounded     atelectasis within the       medial left lung base.  There is an unchanged pleural based lung nodule     measuring 5 mm within the       posterior right upper lobe seen on image 18. There is a unchanged 4 mm nodule     within the left  upper       lobe image 20.             There is a small hiatal hernia.  The spleen contains multiple calcified     granulomas.  There are no       acute osseous findings.             CONCLUSION:         1.Resolution of the previously seen pleural effusions with decreasing lower lobe    consolidation now       with predominantly bandlike atelectasis.  Unchanged bandlike consolidation     within the right middle       lobe.      2.  Unchanged 5 mm nodule within the posterior right upper lobe.  A followup     chest CT in 1 year is       recommended.              JOYCE GUAMAN MD             Electronically Signed and Approved By: JOYCE GUAMAN MD on 12/14/2018 at 8:11                           Until signed, this is an unconfirmed preliminary report that may contain      errors and is subject to change.                                              BATB1:      D:12/14/18 0811            Assessment      Pulmonary nodule - R91.1            CAP (community acquired pneumonia) - J18.9            Notes      New Medications      * predniSONE* 20 MG TABLET: 40 MG PO QDAY      Discontinued Medications      * predniSONE* 50 MG TABLET: 50 MG PO QDAY #14         Dx: Pneumonia - J18.9      * predniSONE* (Deltasone*) 10 MG TABLET: 10 MG PO ASDIR #[id number]         Instructions: [id number]         Dx: Abnormal CT scan, chest - R93.8      New Diagnostics      * Chest W/O Cont CT, 4 Months         Dx: Pulmonary nodule - R91.1      * Chest 2 View, 6 WEEKS         Dx: Pulmonary nodule - R91.1      ASSESSMENT:       1. 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       2. Dyspnea  improving.      3. History of asthma appears to be well controlled.       4. Pulmonary nodule 5 mm in right upper lobe on CT scan of the chest from     December 2018.             PLAN:      1. I  have discussed with the patient that as she has completed a recent course     of antibiotics and is on a slow tapered course of prednisone and seems to be     having improvement of her symptoms, I recommend that she continue and complete     her current tapered course of prednisone. She is to decrease by 10 mg every 2     weeks until she is off prednisone.  She already has a prescription for this.       2. I will repeat a chest x-ray in 6 weeks after she has completed the prednisone    to ensure resolution of pneumonia.       3. I will also get a follow up CT scan of the chest in December 2019 for 1 year     follow up of her 5 mm right upper lobe pulmonary nodule.       4. Follow up in the office with Dr. Marquez in 6 weeks to ensure continued     improvement and resolution of symptoms after completing her prednisone taper.     She can call sooner if needed.            Patient Education      Time Spent:  > 50% /[facility]                 Disclaimer: Converted document may not contain table formatting or lab diagrams. Please see [employer] for the authenticated document.

## 2021-05-28 NOTE — PROGRESS NOTES
Patient: TY MULLINS     Acct: [id number]     Report: #[id number]  UNIT #: [id number]     : 1960    Encounter Date:2019  PRIMARY CARE: XAVI DAVIS  ***Signed***  --------------------------------------------------------------------------------------------------------------------  Chief Complaint      Encounter Date      Dec 19, 2019            Primary Care Provider      XAVI DAVIS            Referring Provider      XAVI DAVIS            Patient Complaint      Patient is complaining of      Patient here today for increased SOA, Asthma            VITALS      Height 5 ft 3 in / 160.02 cm      Weight 152 lbs  / 68.807959 kg      BSA 1.72 m2      BMI 26.9 kg/m2      Temperature 97.8 F / 36.56 C - Oral      Pulse 65      Respirations 16      Blood Pressure 143/70 Sitting, Left Arm      Pulse Oximetry 95%, room air      Initial Exhaled Nitrous Oxide      Exhaled Nitrous Oxide Results:  26            Repeated Exhaled Nitrous Oxide      Date:  Dec 19, 2019      1st Repeated Nitrous Oxide Res:  24            HPI      The patient is a 59 year old  female, patient of Dr. Marquez and Dr. Peterson. Patient was seen by SHRUTI Archibald, a few weeks ago for acute visit for    increased dyspnea, productive cough and wheezing.  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  The patient    states that she was recently seen by Dr. Davis, her PCP and was told that she     had pneumonia again and was given antibiotics and a steroid regimen. The patient    states that she has had four episodes of pneumonia since the end of 2019.      The patient states that Dr. Davis now has her on a steroid taper, was on 40 mg     for six days and is now down to 30 mg.  The patient states that  antibiotics do     not seem to help her at all, however steroids help her significantly and when     she comes off the steroids, all of her symptoms begin to return.  The patient     had a CT scan of the chest done on 11/18/2019 that showed improvement. She did     have some stable scarring in the lung bases bilaterally, a 4 mm nodule in the     right upper lobe that was stable, slightly smaller than prior, no evidence of     pneumonia or acute infiltrate with pleural effusion. The patient also had an AP     chest x-ray and decubitus chest x-ray completed at [facility] on     12/11/2019.  Chest AP x-ray showed no significant changes compared to previous     study. There was patchy airspace disease present within the lung bases     bilaterally which may represent atelectasis and scarring, chronic pneumonia     cannot be excluded. The patient also had a small right pleural effusion.  Chest     decubitus showed a small amount of layering fluid present on the left.  The     patient states that she is currently on steroids and her symptoms have improved.     The patient states that she is currently not coughing or wheezing, however she     does have some shortness of air.  The patient states that she is taking     Symbicort everyday as prescribed and an albuterol inhaler to use as needed. The     patient denies fevers, chills, night sweats, hemoptysis, purulent sputum product    ion, chest pain, chest tightness, nausea, vomiting or swollen glands in the head    and neck.  The patient denies any history of autoimmune diseases, joint pain and    Raynaud's. The patient states that she is under the care of [facility] and she does have a significant amount of allergies and was suppose to     start allergy shots, however they will not let her start allergy shots while she    is taking steroids.              I have personally reviewed the review of systems, past family, social, surgical     and  medical histories and I agree with those as entered in the chart.      Copies To:   Donaldo Peterson      Constitutional:  Denies: Fatigue, Fever, Weight gain, Weight loss, Chills,     Insomnia, Other      Respiratory/Breathing:  Complains of: Shortness of air, Wheezing; Denies: Cough,    Hemoptysis, Pleuritic pain, Other      Endocrine:  Denies: Polydipsia, Polyuria, Heat/cold intolerance, Abnorml     menstrual pattern, Diabetes, Other      Eyes:  Denies: Blurred vision, Vision Changes, Other      Ears, nose, mouth, throat:  Denies: Mouth lesions, Thrush, Throat pain,     Hoarseness, Allergies/Hay Fever, Post Nasal Drip, Headaches, Recent Head Injury,    Nose Bleeding, Neck Stiffness, Thyroid Mass, Hearing Loss, Ear Fullness, Dry     Mouth, Nasal or Sinus Pain, Dry Lips, Nasal discharge, Nasal congestion, Other      Cardiovascular:  Denies: Palpitations, Syncope, Claudication, Chest Pain, Wake     up Gasping for air, Leg Swelling, Irregular Heart Rate, Cyanosis, Dyspnea on     Exertion, Other      Gastrointestinal:  Complains of: Reflux/Heartburn; Denies: Nausea, Constipation,    Diarrhea, Abdominal pain, Vomiting, Difficulty Swallowing, Dysphagia, Jaundice,     Bloating, Melena, Bloody stools, Other      Genitourinary:  Denies: Urinary frequency, Incontinence, Hematuria, Urgency,     Nocturia, Dysuria, Testicular problems, Other      Musculoskeletal:  Denies: Joint Pain, Joint Stiffness, Joint Swelling, Myalgias,    Other      Hematologic/lymphatic:  DENIES: Lymphadenopathy, Bruising, Bleeding tendencies,     Other      Neurological:  Complains of: Headache, Weakness; Denies: Numbness, Seizures,     Other      Psychiatric:  Denies: Anxiety, Appropriate Effect, Depression, Other      Sleep:  Yes: Excessive daytime sleep, Insomnia?; No: Morning Headache?, Snoring,    Stop breathing at sleep?, Other      Integumentary:  Denies: Rash, Dry skin, Skin Warm to Touch, Other      Immunologic/Allergic:  Denies:  Latex allergy, Seasonal allergies, Asthma,     Urticaria, Eczema, Other      Immunization status:  No: Up to date            FAMILY/SOCIAL/MEDICAL HX      Surgical History:  No: AAA Repair, Abdominal Surgery, Adenoids, Angioplasty,     Appendectomy, Back Surgery, Bladder Surgery, Bowel Surgery, Breast Surgery,     CABG, Carotid Stenosis, Cholecystectomy, Ear Surgery, Eye Surgery, Head Surgery,    Hernia Surgery, Kidney Surgery, Nose Surgery, Oral Surgery, Orthopedic Surgery,     Prostatectomy, Rectal Surgery, Spinal Surgery, Testicular Surgery, Throat     Surgery, Tonsils, Valve Replacement, Vascular Surgery, Other Surgeries      Heart - Family Hx:  Mother, Father, Brother      Cancer/Type - Family Hx:  Sister      Is Father Still Living?:  No      Is Mother Still Living?:  No       Family History:  Yes      Social History:  No Tobacco Use, No Alcohol Use, No Recreational Drug use      Smoking status:  Never smoker       Section:  Yes      Anticoagulation Therapy:  No      Antibiotic Prophylaxis:  No      Medical History:  Yes: Asthma (MILD), Hemorrhoids/Rectal Prob (REFLUX, HIATAL     HERNIA), High Cholesterol, Shortness Of Breath (COUGH), Thyroid Problem (hypo);     No: Alcoholism, Allergies, Anemia, Arthritis, Atrial Fibrillation, Blood     Disease, Broken Bones, Cataracts, Chemical Dependency, Chemotherapy/Cancer,     Chronic Bronchitis/COPD, Emphysema, Chronic Liver Disease, Colon Trouble,     Colitis, Diverticulitis, Congestive Heart Failu, Deafness or Ringing Ears,     Convulsions, Depression, Anxiety, Bipolar Disorder, PTSD, Diabetes, Epilepsy,     Seizures, Forgetfullness, Glaucoma, Gall Stones, Gout, Head Injury, Heart     Attack, Heart Murmur, GERD, Hepatitis, Hiatal Hernia, High Blood Pressure, HIV     (Do not ask - volu, Jaundice, Kidney or Bladder Disease, Kidney Stones, Migrane     Headaches, Mitral Valve Prolapse, Night sweats, Phlebitis, Psychiatric Care,     Reflux Disease, Rheumatic Fever,  Sexually Transmitted Dis, Sinus Trouble, Skin     Disease/Psoriais/Ecz, Stroke, Tuberculosis or Pos TB Te, Miscellaneous     Medical/oth      Psychiatric History      none            PREVENTION      Hx Influenza Vaccination:  No      Influenza Vaccine Declined:  Yes      2 or More Falls Past Year?:  No      Fall Past Year with Injury?:  No      Hx Pneumococcal Vaccination:  No      Encouraged to follow-up with:  PCP regarding preventative exams.      Chart initiated by      Chiqui Frey CMA            ALLERGIES/MEDICATIONS      Allergies:        Coded Allergies:             LATEX (Verified  Allergy, Severe, SWELLING, 12/19/19)      Medications    Last Reconciled on 12/19/19 11:24 by BRISEIDA VELÁZQUEZ       Albuterol/Ipratropium (Duoneb) 3 Ml Ampul.neb      3 ML INH Q4H PRN for SHORTNESS OF BREATH, #120 NEB 5 Refills         Prov: BRISEIDA VELÁZQUEZ P[facility]         12/19/19       predniSONE (predniSONE) 20 Mg Tablet      40 MG PO QDAY, TAB 0 Refills         Reported         12/19/19       Esomeprazole Mag (nexIUM) 20 Mg Capsule.dr      20 MG PO BIDAC, #60 CAP 0 Refills         Reported         12/19/19       Montelukast Sodium (Montelukast*) 10 Mg Tablet      10 MG PO HS, TAB         Reported         11/11/19       Budesonide/Formoterol Fumarate (Symbicort 160/4.5 Mcg) 10.2 Gm Inh      2 PUFF INH RTBID, #1 INH 0 Refills         Reported         11/11/19       Lactobacillus Rhamnosus  (Probiotic Digestive Care) 1 Each Capsule      1 EACH PO, CAP         Reported         9/11/18       Albuterol (Proair HFA) 8.5 Gm Inh      1-2 PUFFS INH RTQ6H PRN for SHORTNESS OF BREATH, #1 INH 0 Refills         Reported         3/8/18       Escitalopram Oxalate (Escitalopram Oxalate*) 10 Mg Tablet      10 MG PO HS, TAB         Reported         3/8/18       Levothyroxine (Levothyroxine) 0.088 Mg Tablet      0.088 MG PO QDAY, #30 TAB 0 Refills         Reported         3/8/18      Current Medications      Current Medications Reviewed  12/19/19            EXAM      CONSTITUTIONAL: Well built, well nourished, in no acute distress.       EYES : Pink conjunctive, no ptosis, PERRL.       ENMT : Nose and ears appear normal, normal dentition, mild posterior pharyngeal     wall erythema, no sinus tenderness.      Neck: Nontender, no masses, no thyromegaly, no nodules.      Resp : Normal resp effort, B/l vesicular breath sounds, no wheezing, crackles or    rhonchi.  Resonant to percussion bilaterally.      CVS  : No carotid bruits, s1s2 nl, RRR, no murmur, rubs or gallop, no peripheral    edema       Chest wall: Mildly decreased breath sounds throughout, no wheezes, rales or     rhonchi appreciated, normal work of breathing noted.  Patient can speak full     sentences without difficulty.      GI   : Abdomen soft, with no masses, no hepatosplenomegaly, no hernias, BS+      MSK  : Normal gait and station, no digital cyanosis or clubbing       Skin : No rashes, ulcerations or lesions, normal turgor and temperature      Neuro: CN II - XII intact, no sensory deficits, DTRs intact and symmetrical, no     motor weakness      Psych: Appropriate affect, A   Vtials      Vitals:             Height 5 ft 3 in / 160.02 cm           Weight 152 lbs  / 68.606855 kg           BSA 1.72 m2           BMI 26.9 kg/m2           Temperature 97.8 F / 36.56 C - Oral           Pulse 65           Respirations 16           Blood Pressure 143/70 Sitting, Left Arm           Pulse Oximetry 95%, room air            REVIEW      Results Reviewed      PCCS Results Reviewed?:  Yes Prev Lab Results, Yes Prev Radiology Results, Yes     Previous Mecial Records      Radiographic Results               [facility]                PACS RADIOLOGY REPORT            Patient: TY MULLINS   Acct: #[id number]   Report: #[id number]            UNIT #: [id number]    DOS: 12/11/19 1642      INSURANCE:[employer] - P[employer]   ORDER #:[id number]  [id number]      LOCATION:OP     : 1960            PROVIDERS      ADMITTING:     ATTENDING: XAVI LONG      FAMILY:  XAVI LONG   ORDERING:  XAVI LONG         OTHER:    DICTATING:  Christina Noriega MD            REQ #:[id number]   EXAM:CXR2 - CHEST 2V AP PA LAT      REASON FOR EXAM:  R05,R06.00,Z00.00,J15.8      REASON FOR VISIT:  R05,R06.00,Z00.00,J15.8            *******Signed******         PROCEDURE:   CHEST AP/PA AND LATERAL             COMPARISON:   [facility], CR, CHEST PA/AP   15:18.  [facility], CR, CHEST PA/AP          INDICATIONS:   LEFT SIDE CHEST PAIN.             FINDINGS:         The lung volumes are low.  Patchy airspace changes are present within the lung     bases bilaterally.        There is a small right-sided pleural effusion.  The heart appears normal in     size.  No pneumothorax.        The pulmonary vasculature appears within normal limits.  The osseous structures    appear intact.             CONCLUSION:         No significant changes compared to the previous study.  Patchy airspace disease     is present within       the lung bases bilaterally which may be related to atelectasis and scarring.      Chronic pneumonia       cannot be excluded.  There is a stable small right-sided pleural effusion..               CHRISTINA NORIEGA MD             Electronically Signed and Approved By: CHRISTINA NORIEGA MD on 2019 at 17:14                        Until signed, this is an unconfirmed preliminary report that may contain      errors and is subject to change.                                              KOGIL:      D:19 1714               [facility]                PACS RADIOLOGY REPORT            Patient: TY MULLINS   Acct: #[id number]   Report: #[id number]            UNIT #: [id number]    DOS: 19 1642      INSURANCE:[place] ACCESS NETWORK - O   ORDER #:[id number]       LOCATION:OP     : 1960            PROVIDERS      ADMITTING:     ATTENDING: XAVI LONG      FAMILY:  XAVI LONG   ORDERING:  XAVI LONG         OTHER:    DICTATING:  Naresh Tuttle MD            REQ #:[id number]   EXAM:CXRDLT - CHEST DECUBITUS 1 VIEW LEFT      REASON FOR EXAM:  R05,R06.00,Z00.00,J15.8      REASON FOR VISIT:  R05,R06.00,Z00.00,J15.8            *******Signed******         PROCEDURE:   CHEST DECUBITUS 1 VIEW LEFT             COMPARISON:   .  [facility], CT, CHEST W/O CONTRAST,     2019, 12.  :[address] [place] [address], CR, CHEST PA/AP          INDICATIONS:   LEFT SIDE CHEST PAIN .             FINDINGS:         There appears to be a small amount of layering fluid present on the left.      Patchy airspace changes       are present within the lung bases bilaterally.  The heart appears normal in s    ize.  The osseous       structures appear intact.             CONCLUSION:         A small amount of layering fluid is likely present on the left.              NARESH TUTTLE MD             Electronically Signed and Approved By: NARESH TUTTLE MD on 2019 at 17:16                        Until signed, this is an unconfirmed preliminary report that may contain      errors and is subject to change.                                              KOGIL:      D:19 1716      I reviewed patient's noncontrast chest CT from  and chest x-ray dated    for 2019.                       [facility]                PACS RADIOLOGY REPORT            Patient: TY MULLINS   Acct: #[id number]   Report: #[id number]            UNIT #: [id number]    DOS: 19 1215      INSURANCE:[place] ACCESS NETWORK - O   ORDER #:[id number]      LOCATION:MANOJ     : 1960            PROVIDERS      ADMITTING:     ATTENDING: Catina Perry PA-C      FAMILY:  XAVI LONG   ORDERING:   Catian Perry PA-C         OTHER:    DICTATING:  Christina Tuttle MD            REQ #:[id number]   EXAM:WO - CT CHEST without CONTRAST      REASON FOR EXAM:        REASON FOR VISIT:  PLEURAL EFF            *******Signed******         PROCEDURE:   CT CHEST WITHOUT CONTRAST             COMPARISON:   [facility], CT, CHEST W/O CONTRAST,     9/21/2018, 15:58.        [facility], CR, CHEST PA/AP   [facility], CT, CHEST W/O CONTRAST, 12/14/2018, 7:50.             INDICATIONS:   ABNORMAL CXR, COUGHING, NON SMOKER             TECHNIQUE:   CT images were created without the administration of contrast     material.               PROTOCOL:     Standard imaging protocol performed                RADIATION:     DLP: 223.6mGy*cm          Automated exposure control was utilized to minimize radiation dose.              FINDINGS:      No well-defined consolidations or significant pleural effusions are observed.      Scarring is present       within the lung bases bilaterally which appears similar as compared to the     previous study.        Granulomatous calcifications are present.  A tiny subpleural pulmonary nodule     seen within the       posterior aspect of the right upper lobe measuring up to 4 mm (series 204, image    22).  This appears       less pronounced as compared to the previous study.  A component of this may     related to volume       averaging.  No definite new pulmonary nodules identified.        No significant hilar, mediastinal, or axillary lymphadenopathy is seen. A normal    aortic arch       branching pattern is noted. The heart appears normal in size. No pericardial     effusion identified.       The thyroid gland is unremarkable. The esophagus is unremarkable.             The limited evaluation of the upper abdomen demonstrates no definitive acute     abnormalities.        Granulomatous calcifications are present.  There is a small to  moderate size     hiatal hernia.             No acute osseous abnormalities are seen.                CONCLUSION:         1. No evidence for acute intrathoracic abnormality.  No suspicious     consolidations or pleural       effusions.  Stable scarring is present within the lung bases bilaterally.      2. 4 mm pulmonary nodule within the subpleural right upper lobe which appears     less pronounced as       compared to the previous study.  A component of this may be related to volume     averaging.  CT       follow-up in 6-12 months is recommended to evaluate for stability.      3. Small to moderate size hiatal hernia.      4. Ancillary findings as described above.              NARESH NORIEGA MD             Electronically Signed and Approved By: NARESH NORIEGA MD on 11/18/2019 at 13:18                        Until signed, this is an unconfirmed preliminary report that may contain      errors and is subject to change.                                              KOGIL:      D:11/18/19 1318            Assessment      Recurrent pneumonia - J18.9            Dyspnea - R06.00            Notes      New Medications      * Esomeprazole Mag (nexIUM) 20 MG CAPSULE.DR: 20 MG PO BIDAC #60         Instructions: Take on an empty stomach, one hour before or two hours after        meal.      * predniSONE 20 MG TABLET: 40 MG PO QDAY      * Albuterol/Ipratropium (Duoneb) 3 ML AMPUL.NEB: 3 ML INH Q4H PRN SHORTNESS OF       BREATH #120         Instructions: DIAGNOSIS CODE REQUIRED PRIOR TO PRESCRIBING.         Dx: Recurrent pneumonia - J18.9      New Diagnostics      * Fungal Serology Prof, Routine         Dx: Recurrent pneumonia - J18.9      * Immunoglobulin  E (I, Week         Dx: Recurrent pneumonia - J18.9      * 1 3 BETA D GLUCAN FUNGIT BDGLU, Routine         Dx: Recurrent pneumonia - J18.9      * Immuno A (Iga), Routine         Dx: Recurrent pneumonia - J18.9      * Immuno G (Igg), Routine         Dx: Recurrent pneumonia - J18.9      *  Immuno M (Igm), Routine         Dx: Recurrent pneumonia - J18.9      * ASPERGILLUS AB QN DID APGAQ, Routine         Dx: Recurrent pneumonia - J18.9      * Aspergillus Galactom, Routine         Dx: Recurrent pneumonia - J18.9      * Fungal Antibodies Cs, Routine         Dx: Recurrent pneumonia - J18.9      * Hypersensitivity Pneumon Panel, As Soon As Possible         Dx: Recurrent pneumonia - J18.9      * Upper Resp [place] Imm Prof, Week         Dx: Recurrent pneumonia - J18.9      * Histoplasma Antigen Urine, Routine         Dx: Recurrent pneumonia - J18.9      * Rast Aspergillus Fum IGE, Week         Dx: Recurrent pneumonia - J18.9      * CBC, Month         Dx: Recurrent pneumonia - J18.9      * IgG SUBCLASSES 1-4  IGGSC, Routine         Dx: Recurrent pneumonia - J18.9      * Anticytoplas. Neutro ANCA, Routine         Dx: Recurrent pneumonia - J18.9      * 21 Hydoxylase Antibo, Routine         Dx: Recurrent pneumonia - J18.9      * Hiv 1 By Eia W/West , 1 DAY         Dx: Recurrent pneumonia - J18.9      * RA Profile, 1 DAY         Dx: Recurrent pneumonia - J18.9      * PANCHO Screen/Reflex Hep Titer, 1 DAY         Dx: Recurrent pneumonia - J18.9      * Comp Metabolic Panel, 1 DAY         Dx: Recurrent pneumonia - J18.9      * Chest 2 View, Month         Dx: Recurrent pneumonia - J18.9      * CRP HIGH SENSITIVE, Month         Dx: Dyspnea - R06.00      * Sed Rate, Month         Dx: Dyspnea - R06.00      ASSESSMENT:       1. History of pneumonia with concern for chronic eosinophilic pneumonia,     cryptogenic organizing pneumonia.      2. Cough, resolved.      3. Wheezing, resolved.      4. Dyspnea.      5. History of asthma, well-controlled on Symbicort.      6. Pulmonary nodule, 4 mm in right upper lobe, stable on one year follow up CT     scan.      7.  History of medical noncompliance.      8.  Elevated inflammatory markers including CRP, history of pleural effusions     requiring thoracentesis.            PLAN:       1.  I have discussed the patient in detail with Dr. Peterson.  At this time, there     is not  a clear etiology for ongoing dyspnea, however she did have an elevated     CRP and sed rate.  Patient had connective tissue serologies completed already     back in 2018 and were unremarkable.        2. We will repeat a sed rate and CRP and we will see if those have improved     after steroid treatment.      3. Patient is currently on steroids prescribed by Dr. Davis. Patient finished     40 mg for six days and is now on 30. Advised patient to do 30 mg for 7 days,     then 20 mg for 7 days, then 10 mg for 7 days, then stop.        4. We will order IgG subclasses, fungal serologies, aspergillus,     immunoglobulins, RA, PANCHO, HIV, CBC, CMP, and RAST  for further evaluation.        5. Patient to continue Symbicort everyday as prescribed and continue albuterol     inhaler as needed. We will also give patient DuoNebs to use as needed and wrote     patient for nebulizer machine and tubing.        6. Repeat chest x-ray in one month.      7.  FENO was obtained in the office today with a level of 24, high end of     normal.      8.  Patient refuses flu and pneumonia vaccines. Risks of refusing vaccines     discussed with patient. Patient verbalized understanding.  The patient states     that she was told by her PCP, that her type of pneumonia would not be covered by    any of the vaccines, so it was not beneficial for her to get it.  I advised     patient that it would be beneficial for her to get a pneumonia shot, patient     still refuses.        9. Follow up in one month with Dr. Peterson, sooner if needed.            Patient Education      Patient Education Provided:  Acute Bronchitis      Time Spent:  > 50% /Coord Care            Electronically signed by BRISEIDA VELÁZQUEZ [facility]  12/20/2019 12:44       Disclaimer: Converted document may not contain table formatting or lab diagrams. Please see [employer] for the authenticated document.

## 2021-05-28 NOTE — PROGRESS NOTES
Patient: TY MULLINS     Acct: [id number]     Report: #[id number]  UNIT #: [id number]     : 1960    Encounter Date:2018  PRIMARY CARE: XAVI LONG  ***Signed***  --------------------------------------------------------------------------------------------------------------------  Chief Complaint      Encounter Date      Sep 25, 2018            Primary Care Provider      XAVI LONG            Referring Provider      XAVI LONG            Patient Complaint      Patient is complaining of      1 wk f/u ct scan results            VITALS      Height 5 ft 3 in / 160.02 cm      Weight 152 lbs 0 oz / 68.677697 kg      BSA 1.77 m2      BMI 26.9 kg/m2      Temperature 98.0 F / 36.67 C - Oral      Pulse 65      Respirations 12      Blood Pressure 146/73 Sitting, Left Arm      Pulse Oximetry 99%, roomair      Exhaled Nitrous Oxide Testin            HPI      The patient is a very pleasant 58 year old  female with abnormal chest     CT with possible organized pneumonia here for follow up.  The patient's     bronchoscopy showed a nonnecrotizing granulomatous inflammation on tr    ansbronchial lung biopsies with all other cultures negative. The patient's     thoracentesis did have 10% eosinophils.  The patient had some concerns for     cryptogenic organism and the patient was started on a two week course of     prednisone. She is here since last follow up.  Her ESR and CRP have since     normalized. She is feeling much better. Her shortness of breath is better.  She     still gets short of breath walking about 5-600 feet, moderate in severity, worse    with exertion, relieved with rest and overall is about 40-50% better. Her cough     and wheezing have resolved. All of her pains in her chest have resolved. She     feels like she is able to take a deep breath.  Since last visit, chest CT shows     resolution of ground glass infiltrates with tiny right pleural effusion that has    stayed  resolved. She does have some areas of consolidation and atelectasis, left    base greater than right base which are improving. Of note, her left pleural     effusion does appear to be enlarging and is not loculated.  She denies any     orthopnea, paroxysmals nocturnal dyspnea or lower extremity edema.  Denies any     nausea, vomiting, fevers, chills, headaches, night sweats, chest pain or     hemoptysis.  She is able to perform her ADLs without difficulty, denies any     swollen glands or lymph nodes of her head and neck.  She does have also with her    peripheral eosinophilia on CBC mildly elevated eosinophils and pleural fluid.      There was some concern about eosinophilic pneumonia as well which should be     steroid responsive.            I have personally reviewed the review of systems, past family, social, surgical     and medical histories and I agree with the findings.            ROS      Constitutional:  Denies: Fatigue, Fever, Weight gain, Weight loss, Chills,     Insomnia, Other      Respiratory/Breathing:  Complains of: Shortness of air; Denies: Wheezing, Cough,    Hemoptysis, Pleuritic pain, Other      Endocrine:  Denies: Polydipsia, Polyuria, Heat/cold intolerance, Abnorml     menstrual pattern, Diabetes, Other      Eyes:  Denies: Blurred vision, Vision Changes, Other      Ears, nose, mouth, throat:  Denies: Mouth lesions, Thrush, Throat pain,     Hoarseness, Allergies/Hay Fever, Post Nasal Drip, Headaches, Recent Head Injury,    Nose Bleeding, Neck Stiffness, Thyroid Mass, Hearing Loss, Ear Fullness, Dry     Mouth, Nasal or Sinus Pain, Dry Lips, Nasal discharge, Nasal congestion, Other      Cardiovascular:  Denies: Palpitations, Syncope, Claudication, Chest Pain, Wake     up Gasping for air, Leg Swelling, Irregular Heart Rate, Cyanosis, Dyspnea on     Exertion, Other      Gastrointestinal:  Denies: Nausea, Constipation, Diarrhea, Abdominal pain,     Vomiting, Difficulty Swallowing, Reflux/Heartburn,  Dysphagia, Jaundice,     Bloating, Melena, Bloody stools, Other      Genitourinary:  Denies: Urinary frequency, Incontinence, Hematuria, Urgency,     Nocturia, Dysuria, Testicular problems, Other      Musculoskeletal:  Denies: Joint Pain, Joint Stiffness, Joint Swelling, Myalgias,    Other      Hematologic/lymphatic:  DENIES: Lymphadenopathy, Bruising, Bleeding tendencies,     Other      Neurological:  Denies: Headache, Numbness, Weakness, Seizures, Other      Psychiatric:  Denies: Anxiety, Appropriate Effect, Depression, Other      Sleep:  No: Excessive daytime sleep, Morning Headache?, Snoring, Insomnia?, Stop    breathing at sleep?, Other      Integumentary:  Denies: Rash, Dry skin, Skin Warm to Touch, Other      Immunologic/Allergic:  Denies: Latex allergy, Seasonal allergies, Asthma,     Urticaria, Eczema, Other      Immunization status:  No: Up to date            FAMILY/SOCIAL/MEDICAL HX      Surgical History:  No: AAA Repair, Abdominal Surgery, Adenoids, Angioplasty,     Appendectomy, Back Surgery, Bladder Surgery, Bowel Surgery, Breast Surgery,     CABG, Carotid Stenosis, Cholecystectomy, Ear Surgery, Eye Surgery, Head Surgery,    Hernia Surgery, Kidney Surgery, Nose Surgery, Oral Surgery, Orthopedic Surgery,     Prostatectomy, Rectal Surgery, Spinal Surgery, Testicular Surgery, Throat     Surgery, Tonsils, Valve Replacement, Vascular Surgery, Other Surgeries      Heart - Family Hx:  Mother, Father, Brother      Cancer/Type - Family Hx:  Sister      Is Father Still Living?:  No      Is Mother Still Living?:  No       Family History:  Yes      Social History:  No Tobacco Use, No Alcohol Use, No Recreational Drug use      Smoking status:  Never smoker       Section:  Yes      Anticoagulation Therapy:  No      Antibiotic Prophylaxis:  No      Medical History:  Yes: Asthma (MILD), Hemorrhoids/Rectal Prob (REFLUX, HIATAL     HERNIA), High Cholesterol, Shortness Of Breath (COUGH), Thyroid Problem (hypo);      No: Alcoholism, Allergies, Anemia, Arthritis, Atrial Fibrillation, Blood     Disease, Broken Bones, Cataracts, Chemical Dependency, Chemotherapy/Cancer,     Chronic Bronchitis/COPD, Emphysema, Chronic Liver Disease, Colon Trouble,     Colitis, Diverticulitis, Congestive Heart Failu, Deafness or Ringing Ears,     Convulsions, Depression, Anxiety, Bipolar Disorder, PTSD, Diabetes, Epilepsy,     Seizures, Forgetfullness, Glaucoma, Gall Stones, Gout, Head Injury, Heart     Attack, Heart Murmur, GERD, Hepatitis, Hiatal Hernia, High Blood Pressure, HIV     (Do not ask - volu, Jaundice, Kidney or Bladder Disease, Kidney Stones, Migrane     Headaches, Mitral Valve Prolapse, Night sweats, Phlebitis, Psychiatric Care,     Reflux Disease, Rheumatic Fever, Sexually Transmitted Dis, Sinus Trouble, Skin     Disease/Psoriais/Ecz, Stroke, Tuberculosis or Pos TB Te, Miscellaneous     Medical/oth      Psychiatric History      none            PREVENTION      Hx Influenza Vaccination:  No      Influenza Vaccine Declined:  Yes      2 or More Falls Past Year?:  No      Fall Past Year with Injury?:  No      Hx Pneumococcal Vaccination:  No      Encouraged to follow-up with:  PCP regarding preventative exams.      Chart initiated by      petros whitaker/ ma            ALLERGIES/MEDICATIONS      Allergies:        Coded Allergies:             LATEX (Verified  Allergy, Severe, SWELLING, 9/25/18)      Medications    Last Reconciled on 9/25/18 11:48 by CAS ROMAN MD      predniSONE* (Deltasone*) 10 Mg Tablet      10 MG PO ASDIR, #45 TAB 0 Refills         Prov: CAS ROMAN         9/25/18       predniSONE* (predniSONE*) 50 Mg Tablet      50 MG PO QDAY, #14 TAB         Prov: Catina Perry PA-C         9/17/18       Lactobacillus Rhamnosus  (Probiotic Digestive Care) 1 Each Capsule      1 EACH PO, CAP         Reported         9/11/18       Albuterol (Proair HFA*) 8.5 Gm Inh      1-2 PUFFS INH RTQ6H PRN for SHORTNESS OF BREATH, #1  INH 0 Refills         Reported         3/8/18       Escitalopram Oxalate (Escitalopram Oxalate*) 10 Mg Tablet      10 MG PO HS, TAB         Reported         3/8/18       Levothyroxine (Levothyroxine) 0.088 Mg Tablet      0.088 MG PO QDAY, #30 TAB 0 Refills         Reported         3/8/18      Current Medications      Current Medications Reviewed 9/25/18            EXAM      Vital Signs Reviewed.      General:  WDWN, Alert, NAD.      HEENT: PERRL, EOMI.  OP, nares clear, no sinus tenderness.      Neck: Supple, no JVD, no thyromegaly.      Lymph: No axillary, cervical, supraclavicular lymphadenopathy noted bilaterally.      Chest: Good aeration, diminished, but clear to auscultation bilaterally, dull to    percussion left base, no work of breathing noted.      CV: RRR, no MGR, pulses 2+, equal.        Abd: Soft, NT, ND, +BS, no HSM.      EXT: No clubbing, no cyanosis, no edema, no joint tenderness.        Neuro:  A  Skin: No rashes or lesions.      Vtials      Vitals:             Height 5 ft 3 in / 160.02 cm           Weight 152 lbs 0 oz / 68.547499 kg           BSA 1.77 m2           BMI 26.9 kg/m2           Temperature 98.0 F / 36.67 C - Oral           Pulse 65           Respirations 12           Blood Pressure 146/73 Sitting, Left Arm           Pulse Oximetry 99%, roomair            REVIEW      Results Reviewed      PCCS Results Reviewed?:  Yes Prev Lab Results, Yes Prev Radiology Results, Yes     Previous [facility] Records      Lab Results      I reviewed Marya Archibald last office note. I reviewed my last thoracentesis     note and thoracentesis pathology.  Thoracentesis was 10% eosinophils and     exudate.  I reviewed last CBC showing peripheral eosinophilia.  I reviewed     connective tissue serologies that were negative.  I reviewed CBC showing     peripheral eosinophilia.  I also reviewed the pathology from Dr. Harmon's     bronchoscopy.              Patient: TY MULLINS   Acct #: [id number]          Report #: [id number]   : 1960 Report #: [id number]      MR #: [id number]   [facility]                                ***Signed***      Procedure Note      Procedure name: ultrasound-guided right-sided thoracentesis            Indication - pleural effusion            This procedural risks were explained to the patient including pneumothorax     requiring chest tube placement, significant bleeding requiring surgery, and i    nfection , understanding of the risk and benefits acknowledged by the patient     and family and she wish to proceed with the procedure.            Procedure details      Ultrasound was use to visualize a  collection of pleural fluid, the diaphragm,     and collapsed lung. At this point, the skin overlying the rib was anesthetized     with 5 mL 1% lidocaine without epinephrine. A thoracentesis needle was then     inserted into the pleural cavity just over top of the rib and pleural fluid was     aspirated back. At this time the thoracentesis catheter was advanced to the     pleural cavity over the needle.  Approximately 700mL of anival serosanguineous     fluid was removed. Pleural fluid was sent for analysis. Chest x-ray was without     complication.            Patient tolerated procedure well            No immediate complications            Procedure(s) Performed      Thoracentesis:  [address] [employer]            CAS ROMAN                  Sep 13, 2018 14:32               <Electronically signed by CAS ROMAN MD>  18 1432      Radiographic Results      I reviewed chest CT from 2018 and compared it to the previous one in August.                    [facility]                PACS RADIOLOGY REPORT            Patient: TY MULLINS   Acct: #[id number]   Report: #[id number]            UNIT #: [id number]    DOS: 18 1630      INSURANCE:[employer] - [facility]   ORDER #:[id number]       LOCATION:[facility]     : 1960            PROVIDERS      ADMITTING:     ATTENDING: Catina Chen PA-C      FAMILY:  XAVI LONG   ORDERING:  Catina Chen PA-C         OTHER:    DICTATING:  Rk Guzman MD, IV            REQ #:[id number]   EXAM:[facility] - CT CHEST without CONTRAST      REASON FOR EXAM:        REASON FOR VISIT:  SOA            *******Signed******         PROCEDURE:   CT CHEST WITHOUT CONTRAST             COMPARISON:   [facility], CT, CHEST W/ CONTRAST,     2018, 16:48.             INDICATIONS:   FOLLOW UP PNEUMONIA AND PLEURAL EFFUSIONS. BEEN ON STEROIDS FOR 5    DAYS. DECREASED       SYMPTOMS.             PROTOCOL:     Standard imaging protocol performed                RADIATION:     DLP: 215.1mGy*cm          Automated exposure control was utilized to minimize radiation dose.              TECHNIQUE:   Axial images of the chest without intravenous contrast.             FINDINGS:      The right pleural effusion has nearly completely resolved.  There has been a     slight increase in the       small left pleural effusion.  No enlarged mediastinal, axillary or hilar lymph     nodes are       identified.  Ground-glass opacities/pulmonary edema has essentially resolved.      There is persistent       airspace consolidation in the lower lung fields with air bronchograms.  There is    a 5 mm       noncalcified nodule posteriorly in the right upper lobe previously obscured by     the pleural       effusion.  The thoracic aorta has a normal caliber.  Minimal coronary artery     calcification is       present.  Images of the upper abdomen are unremarkable.             IMPRESSION:              1.  Near-complete resolution of the right pleural effusion.  Small left pleural     effusion has       increased in size.             2.  Ground-glass opacities/pulmonary edema has essentially resolved.             3.  Persistent airspace consolidation in the lower lung fields with air      bronchograms possibly       secondary to atelectasis or pneumonia.             4.  Minimal Coronary artery calcification.             5.  5 mm noncalcified right upper lobe nodule. The findings include a single,     incidentally       detected, solid pulmonary nodule, measuring less than 6mm.  2017 guidelines from    the [employer]       [employer]ociety for the follow-up and management of incidentally detected indeterminate     pulmonary nodules       in persons at least 35 years of age depend on nodule size (average length and     width) and underlying       risk factors (including smoking and other risk factors). Please consider the fol    lowing       recommendations after clinical assessment of risk factors.  For <6mm solid     nodules: In low risk       patients, no follow-up required.  If suspicious morphology or upper lobe     location, consider 12       month follow-up.  In high risk patients, optional CT in 12 months.                JEIMY VALDEZ MD             Electronically Signed and Approved By: JEIMY VALDEZ MD on 9/21/2018 at 16:35                           Until signed, this is an unconfirmed preliminary report that may contain      errors and is subject to change.                                              HOMARJE:      D:09/21/18 1635            Assessment      Abnormal CT scan, chest - R93.8            Pleural effusion - J90            Notes      New Medications      * predniSONE* (Deltasone*) 10 MG TABLET: 10 MG PO ASDIR #45         Instructions: [id number],[id number]         Dx: Abnormal CT scan, chest - R93.8      New Diagnostics      * Chest W/O Cont CT, 2 Months         Dx: Abnormal CT scan, chest - R93.8      IMPRESSION:      1.   Dyspnea on exertion, improving.      2.  Right pleural effusion, resolving.      3.  Left pleural effusion, enlarging.      4.  Abnormal chest CT.  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  Other considerations could be cryptogenic organizing pneumonia.  Co    nnective tissue is unremarkable.        5. History of asthma, appears to be well controlled.              PLAN:      1.  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        2. Continue steroids and we will add a taper.  We will finish two weeks of     prednisone 50 mg and decrease by 10 mg every two weeks until she is off     steroids.      3.  Left pleural effusion is enlarging. I would like to drain this now that it     is enlarging.  I have discussed the case with Dr. Peterson who will do a     thoracentesis on her this week.  The patient is amendable and aware of the risks    and benefits as she has had a right sided one done previously.        4. ESR CRP were unremarkable.  No indication to repeat.      5.  Connective tissue serologies were unremarkable.      6. We will check noncontrast chest CT in two months.      7.  Follow up with Dr. Peterson or myself in two months.            Patient Education            Patient Education:        Eosinophilia      Thoracentesis      Other Education:  CT results            Procedure Orders      Get Consent signed for:        thoracentesis      Risks and Benefits:        pneumothorax, bleeding, death                 Disclaimer: Converted document may not contain table formatting or lab diagrams. Please see [email or web address] for the authenticated document.

## 2021-05-28 NOTE — PROGRESS NOTES
Patient: TY MULLINS     Acct: [id number]     Report: #[id number]  UNIT #: [id number]     : 1960    Encounter Date:2020  PRIMARY CARE: XAVI LONG  ***Signed***  --------------------------------------------------------------------------------------------------------------------  Chief Complaint      Encounter Date      2020            Primary Care Provider      XAVI LONG            Referring Provider      XAVI LONG            Patient Complaint      Patient is complaining of      4-6 weeks follow up/soa            VITALS      Height 5 ft 3 in / 160.02 cm      Weight 152 lbs 0 oz / 68.844046 kg      BSA 1.72 m2      BMI 26.9 kg/m2      Pulse 89      Respirations 16      Blood Pressure 122/62 Sitting, Right Arm      Pulse Oximetry 92%, room air      Initial Exhaled Nitrous Oxide      Exhaled Nitrous Oxide Results:  26            HPI      The patient is a 59 year old female with history of chronic eosinophilic     pneumonia, cryptogenic organizing pneumonia, pulmonary nodule 4 mm in right     upper lobe which has been stable for a year. She was recently seen by ADRYAN Delvalle and at this time she was having worsening symptoms with cough,     dyspnea and shortness of breath.  She was given a prednisone taper to finish     over more than a month. At that time serum immunoglobulin level as well as     fungal serologies, rheumatoid factor, HIV and all her blood work was sent. She     is here for follow up today.             Overall she is feeling better after the steroids.  She continues to have     shortness of breath with activities. She has not had any pulmonary function     tests. She feels like she is not able to do much. When she uses Symbicort it     does help and she thinks the Symbicort has kept her out of the hospital. She     continues to use Symbicort 2 puffs twice daily.  She completed her course of     prednisone on 2020. She has some chest  discomfort at times since she has     been off the prednisone. I reviewed the repeat chest x-ray with her today. She     has some cough but is not able to produce much phlegm. Her inflammatory markers     including ESR is back to normal but CRP is still high but improved.            ROS      Constitutional:  Complains of: Fatigue; Denies: Fever, Weight gain, Weight loss,    Chills, Insomnia, Other      Respiratory/Breathing:  Complains of: Shortness of air, Wheezing, Pleuritic     pain; Denies: Cough, Hemoptysis, Other      Endocrine:  Denies: Polydipsia, Polyuria, Heat/cold intolerance, Abnorml     menstrual pattern, Diabetes, Other      Eyes:  Denies: Blurred vision, Vision Changes, Other      Ears, nose, mouth, throat:  Denies: Mouth lesions, Thrush, Throat pain,     Hoarseness, Allergies/Hay Fever, Post Nasal Drip, Headaches, Recent Head Injury,    Nose Bleeding, Neck Stiffness, Thyroid Mass, Hearing Loss, Ear Fullness, Dry     Mouth, Nasal or Sinus Pain, Dry Lips, Nasal discharge, Nasal congestion, Other      Cardiovascular:  Denies: Palpitations, Syncope, Claudication, Chest Pain, Wake     up Gasping for air, Leg Swelling, Irregular Heart Rate, Cyanosis, Dyspnea on     Exertion, Other      Gastrointestinal:  Denies: Nausea, Constipation, Diarrhea, Abdominal pain,     Vomiting, Difficulty Swallowing, Reflux/Heartburn, Dysphagia, Jaundice,     Bloating, Melena, Bloody stools, Other      Genitourinary:  Denies: Urinary frequency, Incontinence, Hematuria, Urgency,     Nocturia, Dysuria, Testicular problems, Other      Musculoskeletal:  Denies: Joint Pain, Joint Stiffness, Joint Swelling, Myalgias,    Other      Hematologic/lymphatic:  DENIES: Lymphadenopathy, Bruising, Bleeding tendencies,     Other      Neurological:  Denies: Headache, Numbness, Weakness, Seizures, Other      Psychiatric:  Denies: Anxiety, Appropriate Effect, Depression, Other      Sleep:  No: Excessive daytime sleep, Morning Headache?, Snoring,  Insomnia?, Stop    breathing at sleep?, Other      Integumentary:  Denies: Rash, Dry skin, Skin Warm to Touch, Other      Immunologic/Allergic:  Denies: Latex allergy, Seasonal allergies, Asthma,     Urticaria, Eczema, Other      Immunization status:  No: Up to date            FAMILY/SOCIAL/MEDICAL HX      Surgical History:  No: AAA Repair, Abdominal Surgery, Adenoids, Angioplasty,     Appendectomy, Back Surgery, Bladder Surgery, Bowel Surgery, Breast Surgery,     CABG, Carotid Stenosis, Cholecystectomy, Ear Surgery, Eye Surgery, Head Surgery,    Hernia Surgery, Kidney Surgery, Nose Surgery, Oral Surgery, Orthopedic Surgery,     Prostatectomy, Rectal Surgery, Spinal Surgery, Testicular Surgery, Throat     Surgery, Tonsils, Valve Replacement, Vascular Surgery, Other Surgeries      Heart - Family Hx:  Mother, Father, Brother      Cancer/Type - Family Hx:  Sister      Is Father Still Living?:  No      Is Mother Still Living?:  No       Family History:  Yes      Social History:  No Tobacco Use, No Alcohol Use, No Recreational Drug use      Smoking status:  Never smoker       Section:  Yes      Anticoagulation Therapy:  No      Antibiotic Prophylaxis:  No      Medical History:  Yes: Asthma (MILD), Hemorrhoids/Rectal Prob (REFLUX, HIATAL     HERNIA), High Cholesterol, Shortness Of Breath (COUGH), Thyroid Problem (hypo);     No: Alcoholism, Allergies, Anemia, Arthritis, Atrial Fibrillation, Blood     Disease, Broken Bones, Cataracts, Chemical Dependency, Chemotherapy/Cancer,     Chronic Bronchitis/COPD, Emphysema, Chronic Liver Disease, Colon Trouble,     Colitis, Diverticulitis, Congestive Heart Failu, Deafness or Ringing Ears,     Convulsions, Depression, Anxiety, Bipolar Disorder, PTSD, Diabetes, Epilepsy,     Seizures, Forgetfullness, Glaucoma, Gall Stones, Gout, Head Injury, Heart Attac    k, Heart Murmur, GERD, Hepatitis, Hiatal Hernia, High Blood Pressure, HIV (Do     not ask - volu, Jaundice, Kidney or  Bladder Disease, Kidney Stones, Migrane     Headaches, Mitral Valve Prolapse, Night sweats, Phlebitis, Psychiatric Care,     Reflux Disease, Rheumatic Fever, Sexually Transmitted Dis, Sinus Trouble, Skin     Disease/Psoriais/Ecz, Stroke, Tuberculosis or Pos TB Te, Miscellaneous Med    ical/oth      Psychiatric History      none            PREVENTION      Hx Influenza Vaccination:  No      Influenza Vaccine Declined:  Yes      2 or More Falls Past Year?:  No      Fall Past Year with Injury?:  No      Hx Pneumococcal Vaccination:  No      Encouraged to follow-up with:  PCP regarding preventative exams.      Chart initiated by      jes rivera ma            ALLERGIES/MEDICATIONS      Allergies:        Coded Allergies:             LATEX (Verified  Allergy, Severe, SWELLING, 1/29/20)      Medications    Last Reconciled on 1/29/20 12:36 by DONALDO PETERSON MD      Tiotropium Bromide (Spiriva Respimat 1.25 mcg/puff) 4 Gm Mist.inhal               Prov: Donaldo Peterson         1/29/20       Tiotropium Bromide (Spiriva Respimat 1.25 mcg/puff) 4 Gm Mist.inhal      2 PUFFS INH RTQDAY, #1 INH 9 Refills         Prov: Donaldo Peterson         1/29/20       Colchicine (Colcrys) 0.6 Mg Tab      0.6 MG PO QDAY for 30 Days, #30 TAB 1 Refill         Prov: Donaldo Peterson         1/29/20       Esomeprazole Mag (nexIUM) 20 Mg Capsule.dr      20 MG PO BIDAC, #60 CAP 0 Refills         Reported         12/19/19       Montelukast Sodium (Montelukast*) 10 Mg Tablet      10 MG PO HS, TAB         Reported         11/11/19       Budesonide/Formoterol Fumarate (Symbicort 160/4.5 Mcg) 10.2 Gm Inh      2 PUFF INH RTBID, #1 INH 0 Refills         Reported         11/11/19       Lactobacillus Rhamnosus  (Probiotic Digestive Care) 1 Each Capsule      1 EACH PO, CAP         Reported         9/11/18       Albuterol (Proair HFA) 8.5 Gm Inh      1-2 PUFFS INH RTQ6H PRN for SHORTNESS OF BREATH, #1 INH 0 Refills         Reported         3/8/18       Escitalopram  Oxalate (Escitalopram Oxalate*) 10 Mg Tablet      10 MG PO HS, TAB         Reported         3/8/18       Levothyroxine (Levothyroxine) 0.088 Mg Tablet      0.088 MG PO QDAY, #30 TAB 0 Refills         Reported         3/8/18      Current Medications      Current Medications Reviewed 1/29/20            EXAM      CONSTITUTIONAL: Pleasant female in no acute distress,  normal conversant.       EYES : Pink conjunctive, no ptosis, PERRL.       ENMT : Nose and ears appear normal, normal dentition, mild posterior pharyngeal     wall erythema, no sinus tenderness. Mallampati classification II      Neck: Nontender, no masses, no thyromegaly, no nodules.      Resp : Bilateral diminished breath sounds at bases, no wheezing or crackles,     scattered rhonchi at bases.  Resonant to percussion bilaterally.      CVS  : No carotid bruits, s1s2 nl, RRR, no murmur, rubs or gallop, no peripheral    edema       Chest wall: Normal rise with inspiration, nontender on palpation.      GI   : Abdomen soft, with no masses, no hepatosplenomegaly, no hernias, BS+      MSK  : Normal gait and station, no digital cyanosis or clubbing       Skin : No rashes, ulcerations or lesions, normal turgor and temperature      Neuro: CN II - XII intact, no sensory deficits, DTRs intact and symmetrical, no     motor weakness      Psych: Appropriate affect, A   Vtials      Vitals:             Height 5 ft 3 in / 160.02 cm           Weight 152 lbs 0 oz / 68.163421 kg           BSA 1.72 m2           BMI 26.9 kg/m2           Pulse 89           Respirations 16           Blood Pressure 122/62 Sitting, Right Arm           Pulse Oximetry 92%, room air            REVIEW      Results Reviewed      PCCS Results Reviewed?:  Yes Prev Lab Results, Yes Prev Radiology Results, Yes     [facility]      Lab Results      The patient's most recent ESR is 18 compared to 37 a month ago. The patient's     CRP is 4.94 which is improved from 5.1 but it is still high. CBC  showed shows     high eosinophil count at 6-7% when she was off steroids. She is off steroids no    w.      Radiographic Results               [facility]                PACS RADIOLOGY REPORT            Patient: TY MULLINS   Acct: #[id number]   Report: #[id number]            UNIT #: [id number]    DOS: 20 1213      INSURANCE:[employer]WORK - [facility]   ORDER #:[id number]      LOCATION:[facility]     : 1960            PROVIDERS      ADMITTING:     ATTENDING: BRISEIDA VELÁZQUEZ      FAMILY:  BRISEIDA VELÁZQUEZ   ORDERING:  BRISEIDA VELÁZQUEZ         OTHER:    DICTATING:  GALEN HERNANDEZ MD            REQ #:[id number]   EXAM:CXR2 - CHEST 2V AP PA LAT      REASON FOR EXAM:  J18.9 R06.00      REASON FOR VISIT:  J18.9 R06.00            *******Signed******         PROCEDURE:   CHEST AP/PA AND LATERAL             COMPARISON:   [facility], CR, CHEST PA/AP   16:43.  [facility], CR, CHEST DECUBITUS 1 VIEW LEFT, 2019, 16:59.             INDICATIONS:   RECURRENT PNEUMONIA FOR 5 MONTHS             FINDINGS:         There is bilateral basilar discoid airspace disease which is likely atelectasis.     There are       bilateral pleural effusions.  The pulmonary vascular markings are normal.  The     heart size is       normal.             CONCLUSION:         1. Bilateral lower lobe discoid airspace disease is most likely atelectasis.        2. There are bilateral pleural effusions.              GALEN HERNANDEZ MD             Electronically Signed and Approved By: GALEN HERNANDEZ MD on 2020 at 12:39                        Until signed, this is an unconfirmed preliminary report that may contain      errors and is subject to change.                                              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            ORNELAS (dyspnea on exertion) -  R06.09            Notes      New Medications      * TIOTROPIUM BROMIDE (Spiriva Respimat 1.25 mcg/puff) 4 GM MIST.INHAL: 2 PUFFS       INH RTQDAY #1      * TIOTROPIUM BROMIDE (Spiriva Respimat 1.25 mcg/puff) 4 GM MIST.INHAL          Sample - Qty 2         Instructions: 2 PUFFS QD         Dx: ORNELAS (dyspnea on exertion) - R06.09      * Colchicine (Colcrys) 0.6 MG TAB: 0.6 MG PO QDAY 30 Days #30      Discontinued Medications      * Albuterol/Ipratropium (Duoneb) 3 ML AMPUL.NEB: 3 ML INH Q4H PRN SHORTNESS OF       BREATH #120         Instructions: DIAGNOSIS CODE REQUIRED PRIOR TO PRESCRIBING.         Dx: Recurrent pneumonia - J18.9      * predniSONE 10 MG TABLET: 10 MG PO ASDIR #48         Instructions: Take 60 mg by mouth x 3 days, 40 mg x 3 days, 30 mg x 3 days,        20 mg x 3 days, then 10 mg x 3 days.      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 She has history of asthma not well controlled on     Symbicort.             1.  Asthma and chronic eosinophilic pneumonia, cryptogenic organizing pneumonia.     She is off prednisone now and she continues to have symptoms. Continue with     Symbicort 2 puffs twice  daily. I will give her sample of spiriva 1.25 mcg once     daily to use. For her chest discomfort with concern for pleuritis, I have given     her colchicine 0.6 mg once daily to use for a week and then stop. I have given     her enough for 2 months.       2. I will check pulmonary function test and 6 minute walk test.       3. I will check an echocardiogram.       4. I will make a referral for pulmonary rehab.       5. I will check NT-proBNP.       6. She might need to be on chronic low dose prednisone at 2.5-5 mg once daily if    her symptoms are not improving. She also might need to be on biologics based on     her symptoms overall.       7.  Follow up with me in 2 months earlier if needed. If she has worsening     symptoms she will call us for an earlier appointment.            Patient Education      Education resources provided:  Yes      Patient Education Provided:  Acute Bronchitis            Electronically signed by Donaldo Peterson  02/21/2020 15:26       Disclaimer: Converted document may not contain table formatting or lab diagrams. Please see [employer] for the authenticated document.

## 2021-05-28 NOTE — PROGRESS NOTES
Patient: TY MULLINS     Acct: [id number]     Report: #[id number]  UNIT #: [id number]     : 1960    Encounter Date:2019  PRIMARY CARE: XAVI LONG  ***Signed***  --------------------------------------------------------------------------------------------------------------------  Chief Complaint      Encounter Date      2019            Primary Care Provider      XAVI LONG            Referring Provider      XAVI LONG            Patient Complaint      Patient is complaining of      Patient here today for follow up, Pneumonia            VITALS      Height 63 in / 160.02 cm      Weight 153 lbs  / 69.292346 kg      BSA 1.73 m2      BMI 27.1 kg/m2      Temperature 98.3 F / 36.83 C - Oral      Pulse 60      Respirations 14      Blood Pressure 135/65 Sitting, Left Arm      Pulse Oximetry 96%, room air      Initial Exhaled Nitrous Oxide      Exhaled Nitrous Oxide Results:  26            HPI      The patient is a 59 year old female know to myself and Dr. Marquez. I last saw     the patient 2-3 weeks ago for an acute visit for increased dyspnea and cough     productive of purulent brown and green phlegm. She has a history of nonresolving    pneumonia and had bronchoscopy with transbronchial biopsies that showed     nonnecrotizing granulomatous inflammation felt to be consistent with chronic     eosinophilic pneumonia or cryptogenic organizing pneumonia. At that time she was    treated with a slow tapered course of prednisone over several months and her     symptoms resolved. She had previously had a 5 mm right upper lobe pulmonary     nodule seen on CT scan of the chest done in 2018. When I last saw her I    gave her a course of Augmentin and a 2 week tapered course of prednisone for     concern for recurrence of pneumonia. She was not able to give a sputum sample. I    had a chest x-ray done on 19 that was abnormal, it showed consolidation in    the lung bases and  moderate pleural effusion. I had a CT scan of the chest done     on 11/18/19 and it actually looked much improved. She did have some stable     scarring in lung bases bilaterally, 4 mm nodule in right upper lobe that was     stable or slightly smaller than prior and no evidence of pneumonia or acute     infiltrate or pleural effusion.  She is feeling much better than she was 2 weeks    ago. Her cough is resolved, she denies wheezing, hemoptysis, fever or chills or     purulent sputum production. She feels her breathing is nearly back to normal.     She denies any increased dyspnea. She is using Symbicort as prescribed and feels    it is helping.             I reviewed her Review of Systems, medical, surgical and family history and agree    with those as entered.      Copies To:   CAS ROMAN      Constitutional:  Denies: Fatigue, Fever, Weight gain, Weight loss, Chills,     Insomnia, Other      Respiratory/Breathing:  Complains of: Shortness of air; Denies: Wheezing, Cough,    Hemoptysis, Pleuritic pain, Other      Endocrine:  Denies: Polydipsia, Polyuria, Heat/cold intolerance, Abnorml     menstrual pattern, Diabetes, Other      Eyes:  Denies: Blurred vision, Vision Changes, Other      Ears, nose, mouth, throat:  Denies: Mouth lesions, Thrush, Throat pain,     Hoarseness, Allergies/Hay Fever, Post Nasal Drip, Headaches, Recent Head Injury,    Nose Bleeding, Neck Stiffness, Thyroid Mass, Hearing Loss, Ear Fullness, Dry     Mouth, Nasal or Sinus Pain, Dry Lips, Nasal discharge, Nasal congestion, Other      Cardiovascular:  Denies: Palpitations, Syncope, Claudication, Chest Pain, Wake     up Gasping for air, Leg Swelling, Irregular Heart Rate, Cyanosis, Dyspnea on     Exertion, Other      Gastrointestinal:  Denies: Nausea, Constipation, Diarrhea, Abdominal pain,     Vomiting, Difficulty Swallowing, Reflux/Heartburn, Dysphagia, Jaundice,     Bloating, Melena, Bloody stools, Other      Genitourinary:   Denies: Urinary frequency, Incontinence, Hematuria, Urgency,     Nocturia, Dysuria, Testicular problems, Other      Musculoskeletal:  Denies: Joint Pain, Joint Stiffness, Joint Swelling, Myalgias,    Other      Hematologic/lymphatic:  DENIES: Lymphadenopathy, Bruising, Bleeding tendencies,     Other      Neurological:  Denies: Headache, Numbness, Weakness, Seizures, Other      Psychiatric:  Denies: Anxiety, Appropriate Effect, Depression, Other      Sleep:  No: Excessive daytime sleep, Morning Headache?, Snoring, Insomnia?, Stop    breathing at sleep?, Other      Integumentary:  Denies: Rash, Dry skin, Skin Warm to Touch, Other      Immunologic/Allergic:  Denies: Latex allergy, Seasonal allergies, Asthma,     Urticaria, Eczema, Other      Immunization status:  No: Up to date            FAMILY/SOCIAL/MEDICAL HX      Surgical History:  No: AAA Repair, Abdominal Surgery, Adenoids, Angioplasty,     Appendectomy, Back Surgery, Bladder Surgery, Bowel Surgery, Breast Surgery,     CABG, Carotid Stenosis, Cholecystectomy, Ear Surgery, Eye Surgery, Head Surgery,    Hernia Surgery, Kidney Surgery, Nose Surgery, Oral Surgery, Orthopedic Surgery,     Prostatectomy, Rectal Surgery, Spinal Surgery, Testicular Surgery, Throat     Surgery, Tonsils, Valve Replacement, Vascular Surgery, Other Surgeries      Heart - Family Hx:  Mother, Father, Brother      Cancer/Type - Family Hx:  Sister      Is Father Still Living?:  No      Is Mother Still Living?:  No       Family History:  Yes      Social History:  No Tobacco Use, No Alcohol Use, No Recreational Drug use      Smoking status:  Never smoker       Section:  Yes      Anticoagulation Therapy:  No      Antibiotic Prophylaxis:  No      Medical History:  Yes: Asthma (MILD), Hemorrhoids/Rectal Prob (REFLUX, HIATAL     HERNIA), High Cholesterol, Shortness Of Breath (COUGH), Thyroid Problem (hypo);     No: Alcoholism, Allergies, Anemia, Arthritis, Atrial Fibrillation, Blood      Disease, Broken Bones, Cataracts, Chemical Dependency, Chemotherapy/Cancer,     Chronic Bronchitis/COPD, Emphysema, Chronic Liver Disease, Colon Trouble,     Colitis, Diverticulitis, Congestive Heart Failu, Deafness or Ringing Ears, Co    nvulsions, Depression, Anxiety, Bipolar Disorder, PTSD, Diabetes, Epilepsy,     Seizures, Forgetfullness, Glaucoma, Gall Stones, Gout, Head Injury, Heart     Attack, Heart Murmur, GERD, Hepatitis, Hiatal Hernia, High Blood Pressure, HIV     (Do not ask - volu, Jaundice, Kidney or Bladder Disease, Kidney Stones, Migrane     Headaches, Mitral Valve Prolapse, Night sweats, Phlebitis, Psychiatric Care,     Reflux Disease, Rheumatic Fever, Sexually Transmitted Dis, Sinus Trouble, Skin     Disease/Psoriais/Ecz, Stroke, Tuberculosis or Pos TB Te, Miscellaneous     Medical/oth      Psychiatric History      none            PREVENTION      Hx Influenza Vaccination:  No      Influenza Vaccine Declined:  Yes      2 or More Falls Past Year?:  No      Fall Past Year with Injury?:  No      Hx Pneumococcal Vaccination:  No      Encouraged to follow-up with:  PCP regarding preventative exams.      Chart initiated by      Chiqui Frey CMA            ALLERGIES/MEDICATIONS      Allergies:        Coded Allergies:             LATEX (Verified  Allergy, Severe, SWELLING, 11/26/19)      Medications    Last Reconciled on 11/26/19 09:00 by SHRUTI LYNN      Montelukast Sodium (Montelukast*) 10 Mg Tablet      10 MG PO HS, TAB         Reported         11/11/19       Budesonide/Formoterol Fumarate (Symbicort 160/4.5 Mcg) 10.2 Gm Inh      2 PUFF INH RTBID, #1 INH 0 Refills         Reported         11/11/19       Lactobacillus Rhamnosus  (Probiotic Digestive Care) 1 Each Capsule      1 EACH PO, CAP         Reported         9/11/18       Albuterol (Proair HFA) 8.5 Gm Inh      1-2 PUFFS INH RTQ6H PRN for SHORTNESS OF BREATH, #1 INH 0 Refills         Reported         3/8/18       Escitalopram Oxalate  (Escitalopram Oxalate*) 10 Mg Tablet      10 MG PO HS, TAB         Reported         3/8/18       Levothyroxine (Levothyroxine) 0.088 Mg Tablet      0.088 MG PO QDAY, #30 TAB 0 Refills         Reported         3/8/18      Current Medications      Current Medications Reviewed 11/26/19            EXAM      Vital Signs Reviewed      Gen: WDWN, Alert, NAD.        HEENT:  PERRL, EOMI.  OP, nares clear, no sinus tenderness.      Neck:  Supple, no JVD, no thyromegaly.      Lymph: No axillary, cervical, supraclavicular lymphadenopathy noted bilaterally.      Chest: Mildly decreased breath sounds throughout, no wheezes, rhonchi or     crackles, normal work of breathing noted.        CV:  RRR, no MGR, pulses 2+, equal.      Abd:  Soft, NT, ND, + BS, no HSM.      EXT:  No clubbing, no cyanosis, no edema, no joint tenderness.       Neuro:  A  Skin: No rashes or lesions.      Vtials      Vitals:             Height 63 in / 160.02 cm           Weight 153 lbs  / 69.939533 kg           BSA 1.73 m2           BMI 27.1 kg/m2           Temperature 98.3 F / 36.83 C - Oral           Pulse 60           Respirations 14           Blood Pressure 135/65 Sitting, Left Arm           Pulse Oximetry 96%, room air            REVIEW      Results Reviewed      PCCS Results Reviewed?:  Yes Prev Lab Results, Yes Prev Radiology Results, Yes     Previous [facility] Records            Assessment      Notes      Discontinued Medications      * predniSONE* 10 MG TABLET: 10 MG PO ASDIR #48         Instructions: 60mg x 3days, 40mg x 3days, 30mg x 3days, 20mg x 3days, 10mg x        3days         Dx: SOB (shortness of breath) - R06.02      * Amoxicillin/Clavulanic Acid 875/125 (Augmentin 875/125) 1 EACH TABLET: 875 MG       PO BID 7 Days #14         Dx: SOB (shortness of breath) - R06.02      ASSESSMENT:       1. History of pneumonia with concern for chronic eosinophilic pneumonia or     cryptogenic organizing pneumonia with symptoms now resolving.       2.  Cough  resolved.       3. Wheezing resolved.       4. Dyspnea resolving.       5. History of asthma well controlled on Symbicort.       6. Pulmonary nodule 4 mm in right upper lobe stable on 1 year  follow up  CT     scan, will need 6-12 month follow up for next CT scan.       7. History of medical noncompliance.              PLAN:      1. I have discussed with the patient in detail regarding recent CT scan of the     chest results. There was no evidence of pleural effusion or acute infiltrate but    some stable bibasilar scarring and stable 4 mm right upper lobe pulmonary     nodule. She will need her next CT scan of the chest in 6-12 months to continue     follow up and I will defer to scheduling this at her next follow up visit.       2. Overall her symptoms are almost fully resolved now. For now I will continue     her on Symbicort 160 2 puffs twice daily and continue Singulair. I discussed     with the patient that if she remains stable in the next several weeks I am fine     with her starting allergy immunotherapy if deemed appropriate by her allergist.       3. She declines a flu vaccine today.       4. Follow up in 2-3 months with Dr. Marquez to see how she is doing.  I have     specifically instructed her to call us for any new or worsening symptoms. The     patient verbalized understanding.            Patient Education      Patient Education Provided:  How to use an Inhaler      Time Spent:  > 50% /Coord Care            Electronically signed by LINDA ESTEVES PA-C  12/09/2019 16:17       Disclaimer: Converted document may not contain table formatting or lab diagrams. Please see [employer] for the authenticated document.

## 2021-12-15 RX ORDER — TRAZODONE HYDROCHLORIDE 50 MG/1
TABLET ORAL
Qty: 90 TABLET | Refills: 1 | Status: SHIPPED | OUTPATIENT
Start: 2021-12-15 | End: 2022-04-01

## 2022-02-22 RX ORDER — LEVOTHYROXINE SODIUM 88 UG/1
TABLET ORAL
Qty: 90 TABLET | Refills: 3 | OUTPATIENT
Start: 2022-02-22

## 2022-02-22 RX ORDER — ESCITALOPRAM OXALATE 10 MG/1
TABLET ORAL
Qty: 90 TABLET | Refills: 3 | OUTPATIENT
Start: 2022-02-22

## 2022-02-23 RX ORDER — ESCITALOPRAM OXALATE 10 MG/1
TABLET ORAL
Qty: 90 TABLET | Refills: 3 | OUTPATIENT
Start: 2022-02-23

## 2022-02-23 RX ORDER — LEVOTHYROXINE SODIUM 88 UG/1
TABLET ORAL
Qty: 90 TABLET | Refills: 3 | OUTPATIENT
Start: 2022-02-23

## 2022-02-25 RX ORDER — ESCITALOPRAM OXALATE 10 MG/1
TABLET ORAL
Qty: 90 TABLET | Refills: 0 | Status: SHIPPED | OUTPATIENT
Start: 2022-02-25 | End: 2022-02-25

## 2022-02-25 RX ORDER — LEVOTHYROXINE SODIUM 88 UG/1
TABLET ORAL
Qty: 90 TABLET | Refills: 0 | Status: SHIPPED | OUTPATIENT
Start: 2022-02-25 | End: 2022-04-01 | Stop reason: SDUPTHER

## 2022-02-25 RX ORDER — ESCITALOPRAM OXALATE 10 MG/1
10 TABLET ORAL EVERY MORNING
Qty: 90 TABLET | Refills: 0 | Status: SHIPPED | OUTPATIENT
Start: 2022-02-25 | End: 2022-05-18

## 2022-04-01 ENCOUNTER — OFFICE VISIT (OUTPATIENT)
Dept: INTERNAL MEDICINE | Facility: CLINIC | Age: 62
End: 2022-04-01

## 2022-04-01 VITALS
HEART RATE: 66 BPM | SYSTOLIC BLOOD PRESSURE: 136 MMHG | BODY MASS INDEX: 26.58 KG/M2 | DIASTOLIC BLOOD PRESSURE: 78 MMHG | HEIGHT: 63 IN | WEIGHT: 150 LBS | OXYGEN SATURATION: 96 % | TEMPERATURE: 98 F

## 2022-04-01 DIAGNOSIS — D72.818 OTHER DECREASED WHITE BLOOD CELL (WBC) COUNT: ICD-10-CM

## 2022-04-01 DIAGNOSIS — E78.2 MIXED HYPERLIPIDEMIA: ICD-10-CM

## 2022-04-01 DIAGNOSIS — M30.1 EOSINOPHILIC GRANULOMATOSIS WITH POLYANGIITIS (EGPA): ICD-10-CM

## 2022-04-01 DIAGNOSIS — F33.41 RECURRENT MAJOR DEPRESSION IN PARTIAL REMISSION: ICD-10-CM

## 2022-04-01 DIAGNOSIS — E03.4 HYPOTHYROIDISM DUE TO ACQUIRED ATROPHY OF THYROID: ICD-10-CM

## 2022-04-01 DIAGNOSIS — M81.0 POSTMENOPAUSAL BONE LOSS: ICD-10-CM

## 2022-04-01 DIAGNOSIS — E55.9 VITAMIN D DEFICIENCY: ICD-10-CM

## 2022-04-01 DIAGNOSIS — K21.9 GASTRO-ESOPHAGEAL REFLUX DISEASE WITHOUT ESOPHAGITIS: ICD-10-CM

## 2022-04-01 DIAGNOSIS — Z12.31 BREAST CANCER SCREENING BY MAMMOGRAM: ICD-10-CM

## 2022-04-01 DIAGNOSIS — Z00.00 WELL ADULT EXAM: Primary | ICD-10-CM

## 2022-04-01 DIAGNOSIS — D72.18 EOSINOPHILIC GRANULOMATOSIS WITH POLYANGIITIS (EGPA): ICD-10-CM

## 2022-04-01 PROBLEM — J82.81 CHRONIC EOSINOPHILIC PNEUMONIA: Status: ACTIVE | Noted: 2022-04-01

## 2022-04-01 PROBLEM — Z87.898 HISTORY OF DYSPNEA: Status: RESOLVED | Noted: 2021-09-08 | Resolved: 2022-04-01

## 2022-04-01 PROBLEM — I51.7 CARDIOMEGALY: Status: ACTIVE | Noted: 2022-04-01

## 2022-04-01 PROBLEM — D72.819 LEUCOPENIA: Status: ACTIVE | Noted: 2022-04-01

## 2022-04-01 PROBLEM — J45.21 EXACERBATION OF INTERMITTENT ASTHMA: Status: ACTIVE | Noted: 2017-03-21

## 2022-04-01 PROBLEM — J45.21 EXACERBATION OF INTERMITTENT ASTHMA: Status: RESOLVED | Noted: 2017-03-21 | Resolved: 2022-04-01

## 2022-04-01 PROBLEM — G93.32 CHRONIC FATIGUE SYNDROME: Status: ACTIVE | Noted: 2022-04-01

## 2022-04-01 PROBLEM — Z12.39 ENCOUNTER FOR SCREENING FOR MALIGNANT NEOPLASM OF BREAST: Status: ACTIVE | Noted: 2022-04-01

## 2022-04-01 PROBLEM — Z87.898 HISTORY OF DYSPNEA: Status: ACTIVE | Noted: 2021-09-08

## 2022-04-01 PROBLEM — M89.9 DISORDER OF BONE: Status: ACTIVE | Noted: 2022-04-01

## 2022-04-01 PROBLEM — J30.1 ALLERGIC RHINITIS DUE TO POLLEN: Status: ACTIVE | Noted: 2017-03-21

## 2022-04-01 PROBLEM — R89.8 EOSINOPHIL COUNT RAISED: Status: ACTIVE | Noted: 2020-05-20

## 2022-04-01 PROCEDURE — 99396 PREV VISIT EST AGE 40-64: CPT | Performed by: INTERNAL MEDICINE

## 2022-04-01 RX ORDER — TRAZODONE HYDROCHLORIDE 50 MG/1
TABLET ORAL
COMMUNITY
End: 2022-04-01

## 2022-04-01 RX ORDER — OMEPRAZOLE 40 MG/1
CAPSULE, DELAYED RELEASE ORAL DAILY
COMMUNITY
End: 2022-04-01

## 2022-04-01 RX ORDER — LEVOTHYROXINE SODIUM 88 UG/1
1 TABLET ORAL DAILY
COMMUNITY
End: 2022-05-18

## 2022-04-01 RX ORDER — ESCITALOPRAM OXALATE 10 MG/1
TABLET ORAL
COMMUNITY
End: 2022-04-01 | Stop reason: SDUPTHER

## 2022-04-01 RX ORDER — MEPOLIZUMAB 100 MG/ML
INJECTION, SOLUTION SUBCUTANEOUS
COMMUNITY
Start: 2022-02-21

## 2022-04-01 RX ORDER — BENZONATATE 100 MG/1
100 CAPSULE ORAL 3 TIMES DAILY PRN
Qty: 30 CAPSULE | Refills: 2 | Status: SHIPPED | OUTPATIENT
Start: 2022-04-01

## 2022-04-01 RX ORDER — ESCITALOPRAM OXALATE 10 MG/1
1 TABLET ORAL EVERY MORNING
COMMUNITY
End: 2022-04-01 | Stop reason: SDUPTHER

## 2022-04-01 RX ORDER — LEVOTHYROXINE SODIUM 88 UG/1
TABLET ORAL
COMMUNITY
End: 2022-04-01 | Stop reason: SDUPTHER

## 2022-04-01 RX ORDER — ALBUTEROL SULFATE 90 UG/1
AEROSOL, METERED RESPIRATORY (INHALATION)
COMMUNITY
End: 2022-04-01

## 2022-04-01 RX ORDER — MONTELUKAST SODIUM 10 MG/1
TABLET ORAL EVERY 24 HOURS
COMMUNITY
End: 2022-04-01

## 2022-04-01 NOTE — ASSESSMENT & PLAN NOTE
Patient is stable on maintenance SSRI as of her 4/22 office visit.  Certainly stable to continue low-dose Lexapro.

## 2022-04-01 NOTE — ASSESSMENT & PLAN NOTE
Patient is getting vitamin D from her multivitamin as of 4/22.  We will check levels soon and make recommendations then.

## 2022-04-01 NOTE — PROGRESS NOTES
"Chief Complaint  Annual Exam (Labs needed.), Hyperlipidemia, and Hypothyroidism    Subjective      Melva Self presents to [facility]    History of Present Illness  Patient pleasant 61-year-old female with underlying hypothyroidism, untreated hyperlipidemia, chronic leukopenia, among others, recently diagnosed with EGPA followed by specialist, who is coming in 4/22 for annual exam.  We reviewed her med list in detail, have arranged for comprehensive laboratory studies, and made recommendations in regard to some screening studies.    Review of Systems   Constitutional: Negative for appetite change, fatigue and fever.   HENT: Negative for congestion and ear pain.    Eyes: Negative for blurred vision.   Respiratory: Negative for cough, chest tightness, shortness of breath and wheezing.    Cardiovascular: Negative for chest pain, palpitations and leg swelling.   Gastrointestinal: Negative for abdominal pain.   Genitourinary: Negative for difficulty urinating, dysuria and hematuria.   Musculoskeletal: Negative for arthralgias and gait problem.   Skin: Negative for skin lesions.   Neurological: Negative for syncope, memory problem and confusion.   Psychiatric/Behavioral: Negative for self-injury and depressed mood.       Objective   Vital Signs:   /78 (BP Location: Right arm, Patient Position: Sitting)   Pulse 66   Temp 98 °F (36.7 °C)   Ht 160 cm (63\")   Wt 68 kg (150 lb)   SpO2 96%   BMI 26.57 kg/m²           Physical Exam  Vitals and nursing note reviewed.   Constitutional:       General: She is not in acute distress.     Appearance: Normal appearance. She is not toxic-appearing.   HENT:      Head: Atraumatic.      Right Ear: External ear normal.      Left Ear: External ear normal.      Nose: Nose normal.      Mouth/Throat:      Mouth: Mucous membranes are moist.   Eyes:      General:         Right eye: No discharge.         Left eye: No discharge.      " Extraocular Movements: Extraocular movements intact.      Pupils: Pupils are equal, round, and reactive to light.   Neck:      Comments: No carotid bruits.  Cardiovascular:      Rate and Rhythm: Normal rate and regular rhythm.      Pulses: Normal pulses.      Heart sounds: Normal heart sounds. No murmur heard.    No gallop.      Comments: Heart tones normal, no ectopy, no S3.  Pulmonary:      Effort: Pulmonary effort is normal. No respiratory distress.      Breath sounds: No wheezing, rhonchi or rales.      Comments: Lung fields clear bilaterally, no bronchospasm.  Abdominal:      General: There is no distension.      Palpations: Abdomen is soft. There is no mass.      Tenderness: There is no abdominal tenderness. There is no guarding.   Musculoskeletal:         General: No swelling or tenderness.      Cervical back: No tenderness.      Right lower leg: No edema.      Left lower leg: No edema.      Comments: No edema.   Skin:     General: Skin is warm and dry.      Findings: No rash.   Neurological:      General: No focal deficit present.      Mental Status: She is alert and oriented to person, place, and time. Mental status is at baseline.      Motor: No weakness.      Gait: Gait normal.   Psychiatric:         Mood and Affect: Mood normal.         Thought Content: Thought content normal.          Result Review   The following data was reviewed by: Baltazar Davis MD on 04/01/2022:  [x] Laboratory  [] Microbiology  [] Radiology  [] EKG/telemetry  [] Cardiology/Vascular  [] Pathology  [x] Old records             Assessment and Plan   Diagnoses and all orders for this visit:    1. Well adult exam (Primary)  Overview:  Preventive measures: were reviewed with the patient at this office visit. They included but were not limited to discussions in regards to vaccines outstanding, auto safety with seat belts and other assistive devices, fall prevention, and routine screening studies.    Exercise: Patient not able to exercise  intensely as of 4/22 office visit secondary to pulmonary issues, but she has no ischemic issues with routine activity at home.  Comprehensive labs: all needed.    Covid vaccine: Pfizer x2 as of 4/22.  Other vaccines: Patient would rather not as of 4/22.    MMG: 10/6/2020 per me.  Colon: 7/18 = wnl = 10 years.        Orders:  -     CBC & Differential; Future  -     Comprehensive Metabolic Panel; Future    2. Hypothyroidism due to acquired atrophy of thyroid  Assessment & Plan:  Patient is clinically euthyroid on 88 mcg Synthroid daily.  Is been a year or so since we have had a level, will get one soon and make further recommendations at that time.    Orders:  -     TSH+Free T4; Future    3. Mixed hyperlipidemia  Assessment & Plan:  Patient's last LDL was 177 that was a few years back as of her 4/22 office visit.  She has been intolerant to meds in the past due to myalgias.  We will see where she is at this time, can consider newer agent if covered.    Orders:  -     Lipid Panel; Future    4. Other decreased white blood cell (WBC) count  Assessment & Plan:  Most recent WBC was normal since she was on steroids.  She has been off of them for at least 6 months as her for 4/22 office visit, will get level here before too long to establish new baseline.      5. Recurrent major depression in partial remission (HCC)  Assessment & Plan:  Patient is stable on maintenance SSRI as of her 4/22 office visit.  Certainly stable to continue low-dose Lexapro.      6. Vitamin D deficiency  Assessment & Plan:  Patient is getting vitamin D from her multivitamin as of 4/22.  We will check levels soon and make recommendations then.    Orders:  -     Vitamin D 1,25 Dihydroxy; Future    7. Eosinophilic granulomatosis with polyangiitis (EGPA) ([facility])  Overview:  Patient is being followed by Dr. Silver with the [facility].    She is recently been started on Nucala per his direction.      8. Gastro-esophageal reflux disease without  esophagitis  Assessment & Plan:  Patient with no dysphagia and controlled dyspepsia on chronic PPI as of 4/22.  Patient stable to continue over-the-counter Nexium.      9. Postmenopausal bone loss  -     DEXA Bone Density Axial; Future    10. Breast cancer screening by mammogram  -     Mammo Screening Digital Tomosynthesis Bilateral With CAD; Future    Other orders  -     benzonatate (Tessalon Perles) 100 MG capsule; Take 1 capsule by mouth 3 (Three) Times a Day As Needed for Cough.  Dispense: 30 capsule; Refill: 2    --   --  OLDER NOTES:   ANNUAL PHYSICAL 10/20 = no ischemia with run=only able to walk as of 10/20 OV; d/w all labs in detail.  --  HYPOTHYROIDISM was on 1/2 of 125 qd, so try 75 qd now...only take 1/2 pill once a week=70 mcg qd...try 75 qd and call...tried to increase to 88, but felt anxious, took 1/2 pill once a week, but TSH low, so try 1/2 2x/wk = 75 dose again...stable...agree with 88 qd given TSH 4.6...0.3 already, so will take with food if sx's or lower RTO...3.5 with wt gain as of 3/18 OV, so rec repeat 3 mo and call...0.4 with no sxs 1/19...need labs 11/19---> THS 0.4 in 10/20 despite wt gain on the steroids.  --  LIPIDS stable w/o tx...ditto 11/14...bad 11/15 and will tx on RTO if same ballpark...no better with good thyroid, so tx now 4/16 (FH of same)... on it, but stopped due to myalgias and is rubbing lemon oil on feet?... apparently off med past 6 mo and still rubbing oil on feet...187 as of 3/18 OV is off the oil, but is also with wt gain and bump in TSH; not interested in meds...177 but failed med as above---> defer for now 10/20.  --  LEUKOPENIA remains stable, but Lymph % is trending up, was 31-37% 5 yrs ago, so Heme to eval again if no better RTO...it's better, so defer...3.4...2.6 is her nati, but all lines nl %, so repeat in 3-4 mo and to Heme then if same...better 5/19 and ok 7/19 when was sick; defer consult---> 7.3 on steroids.  FE DEF ANEMIA is stable as well on  OTC iron...neg 8/16...12.9---> 14 in 10/20.  --  DEPRESSION and d/w [facility] of action SSRI and will call...intolerant and no help low dose at least; he has sex addicition, she is going to see Jessica Castro... her due to non-compliance with f/u; on lexapro and no new issues--->stable as of 10/20 OV.  --  TINNITUS = hearing pulse, bilat, no other sx's, so defer for now.   A.R. with ? RAD and will add singulair...no new c/o...back on it due to cough this winter.  --  COUGH = ? GERD b/c occurs with food and sisters had this as well; eval/tx per orders=PPI/H2 combo since no help PPI past 2 weeks and zyrtec/benadryl; ? MBSS if no help...at 3/18 OV=c/o pain/pressure in AM in chest and also at night; no dysphagia; rec PPI for a month and then H2; call if no benefit...had to go back on PPI as of 11/19 OV.  --  EOSINOPHILIA per clinic in [place] Immunologist; no true dx as of 10/20; has been on steroids a year now and is on Prednisone 10 mg qd with 1 mg/mo taper; I reviewed records this OV; she has sxs of chest pressure/fatigue/dyspnea and that lets her know to not lower steroids further.  BILATERAL PNA 8/18=s/p Levaquin for 10 days per me; no temps, but pleuritic CP is coming back just a few days off abx and lisa when lying down at night;   ? CHF per CHEST CT and still with ORNELAS, so will get another CXR and labs; (BNP neg in [facility]); consider ECHO to r/o effusion given CMG...get ECHO now just to be sure since sxs are lingering on---> recurrent 7/19 and has f/u CT flora next month as of 11/19 OV;   --  CHEST PAIN=under ribs on R, then radiated into back, and now wraps all the way around, worse at rest=better standing up; + pleuritic component; LGT, no cough per say, but hurts when she does; exam with ? decreased BS in bases, but no cords and no edema; needs CXR, CBC/d-dimer and call for results; if neg, ok to go to Chiropractor...is due to persistent effusion, so will eval for thoaracentesis; is on aleve for this as  well---> s/p tap/bronch per Dr Peterson; ended up on steroids for a few months=off meds by Thanksgiving; still with mild dyspnea.  --  LBP with radiculoapthy on left with flims per chiropractor noted.  --  VIT D DEF and d/w (again) take 2K qd please...try 3K qd again...admits to non-compliance...28 = same issue...30.   BMD   --  MMG 8/5/19 per me.  COLON/EGD 7/18 = neg by report as of 8/18 urgent OV.  REFUSES SHOTS.  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    Follow Up   Return in about 6 months (around 10/1/2022).  Patient was given instructions and counseling regarding her condition or for health maintenance advice. Please see specific information pulled into the AVS if appropriate.

## 2022-04-01 NOTE — ASSESSMENT & PLAN NOTE
Patient is clinically euthyroid on 88 mcg Synthroid daily.  Is been a year or so since we have had a level, will get one soon and make further recommendations at that time.

## 2022-04-01 NOTE — ASSESSMENT & PLAN NOTE
Patient with no dysphagia and controlled dyspepsia on chronic PPI as of 4/22.  Patient stable to continue over-the-counter Nexium.

## 2022-04-01 NOTE — ASSESSMENT & PLAN NOTE
Most recent WBC was normal since she was on steroids.  She has been off of them for at least 6 months as her for 4/22 office visit, will get level here before too long to establish new baseline.

## 2022-04-01 NOTE — ASSESSMENT & PLAN NOTE
Patient's last LDL was 177 that was a few years back as of her 4/22 office visit.  She has been intolerant to meds in the past due to myalgias.  We will see where she is at this time, can consider newer agent if covered.

## 2022-05-18 RX ORDER — LEVOTHYROXINE SODIUM 88 UG/1
TABLET ORAL DAILY
Qty: 90 TABLET | Refills: 1 | Status: SHIPPED | OUTPATIENT
Start: 2022-05-18 | End: 2022-11-02

## 2022-05-18 RX ORDER — ESCITALOPRAM OXALATE 10 MG/1
TABLET ORAL
Qty: 90 TABLET | Refills: 1 | Status: SHIPPED | OUTPATIENT
Start: 2022-05-18 | End: 2022-11-02

## 2022-08-23 ENCOUNTER — LAB (OUTPATIENT)
Dept: LAB | Facility: HOSPITAL | Age: 62
End: 2022-08-23

## 2022-08-23 DIAGNOSIS — E55.9 VITAMIN D DEFICIENCY: ICD-10-CM

## 2022-08-23 DIAGNOSIS — Z00.00 WELL ADULT EXAM: ICD-10-CM

## 2022-08-23 DIAGNOSIS — E78.2 MIXED HYPERLIPIDEMIA: ICD-10-CM

## 2022-08-23 DIAGNOSIS — E03.4 HYPOTHYROIDISM DUE TO ACQUIRED ATROPHY OF THYROID: ICD-10-CM

## 2022-08-23 LAB
ALBUMIN SERPL-MCNC: 4.1 G/DL (ref 3.5–5.2)
ALBUMIN/GLOB SERPL: 2 G/DL
ALP SERPL-CCNC: 135 U/L (ref 39–117)
ALT SERPL W P-5'-P-CCNC: 22 U/L (ref 1–33)
ANION GAP SERPL CALCULATED.3IONS-SCNC: 10 MMOL/L (ref 5–15)
AST SERPL-CCNC: 22 U/L (ref 1–32)
BASOPHILS # BLD AUTO: 0.01 10*3/MM3 (ref 0–0.2)
BASOPHILS NFR BLD AUTO: 0.3 % (ref 0–1.5)
BILIRUB SERPL-MCNC: 0.4 MG/DL (ref 0–1.2)
BUN SERPL-MCNC: 18 MG/DL (ref 8–23)
BUN/CREAT SERPL: 22.8 (ref 7–25)
CALCIUM SPEC-SCNC: 9.2 MG/DL (ref 8.6–10.5)
CHLORIDE SERPL-SCNC: 104 MMOL/L (ref 98–107)
CHOLEST SERPL-MCNC: 244 MG/DL (ref 0–200)
CO2 SERPL-SCNC: 27 MMOL/L (ref 22–29)
CREAT SERPL-MCNC: 0.79 MG/DL (ref 0.57–1)
DEPRECATED RDW RBC AUTO: 39.4 FL (ref 37–54)
EGFRCR SERPLBLD CKD-EPI 2021: 83.6 ML/MIN/1.73
EOSINOPHIL # BLD AUTO: 0 10*3/MM3 (ref 0–0.4)
EOSINOPHIL NFR BLD AUTO: 0 % (ref 0.3–6.2)
ERYTHROCYTE [DISTWIDTH] IN BLOOD BY AUTOMATED COUNT: 13.5 % (ref 12.3–15.4)
GLOBULIN UR ELPH-MCNC: 2.1 GM/DL
GLUCOSE SERPL-MCNC: 88 MG/DL (ref 65–99)
HCT VFR BLD AUTO: 39.7 % (ref 34–46.6)
HDLC SERPL-MCNC: 52 MG/DL (ref 40–60)
HGB BLD-MCNC: 12.8 G/DL (ref 12–15.9)
IMM GRANULOCYTES # BLD AUTO: 0 10*3/MM3 (ref 0–0.05)
IMM GRANULOCYTES NFR BLD AUTO: 0 % (ref 0–0.5)
LDLC SERPL CALC-MCNC: 174 MG/DL (ref 0–100)
LDLC/HDLC SERPL: 3.3 {RATIO}
LYMPHOCYTES # BLD AUTO: 1.13 10*3/MM3 (ref 0.7–3.1)
LYMPHOCYTES NFR BLD AUTO: 35.8 % (ref 19.6–45.3)
MCH RBC QN AUTO: 26.2 PG (ref 26.6–33)
MCHC RBC AUTO-ENTMCNC: 32.2 G/DL (ref 31.5–35.7)
MCV RBC AUTO: 81.2 FL (ref 79–97)
MONOCYTES # BLD AUTO: 0.2 10*3/MM3 (ref 0.1–0.9)
MONOCYTES NFR BLD AUTO: 6.3 % (ref 5–12)
NEUTROPHILS NFR BLD AUTO: 1.82 10*3/MM3 (ref 1.7–7)
NEUTROPHILS NFR BLD AUTO: 57.6 % (ref 42.7–76)
NRBC BLD AUTO-RTO: 0 /100 WBC (ref 0–0.2)
PLATELET # BLD AUTO: 254 10*3/MM3 (ref 140–450)
PMV BLD AUTO: 9.9 FL (ref 6–12)
POTASSIUM SERPL-SCNC: 4.2 MMOL/L (ref 3.5–5.2)
PROT SERPL-MCNC: 6.2 G/DL (ref 6–8.5)
RBC # BLD AUTO: 4.89 10*6/MM3 (ref 3.77–5.28)
SODIUM SERPL-SCNC: 141 MMOL/L (ref 136–145)
T4 FREE SERPL-MCNC: 1.05 NG/DL (ref 0.93–1.7)
TRIGL SERPL-MCNC: 102 MG/DL (ref 0–150)
TSH SERPL DL<=0.05 MIU/L-ACNC: 1.29 UIU/ML (ref 0.27–4.2)
VLDLC SERPL-MCNC: 18 MG/DL (ref 5–40)
WBC NRBC COR # BLD: 3.16 10*3/MM3 (ref 3.4–10.8)

## 2022-08-23 PROCEDURE — 80061 LIPID PANEL: CPT

## 2022-08-23 PROCEDURE — 36415 COLL VENOUS BLD VENIPUNCTURE: CPT

## 2022-08-23 PROCEDURE — 82652 VIT D 1 25-DIHYDROXY: CPT

## 2022-08-23 PROCEDURE — 84439 ASSAY OF FREE THYROXINE: CPT

## 2022-08-23 PROCEDURE — 80050 GENERAL HEALTH PANEL: CPT

## 2022-08-25 LAB — 1,25(OH)2D SERPL-MCNC: 41.7 PG/ML (ref 24.8–81.5)

## 2022-11-02 RX ORDER — ESCITALOPRAM OXALATE 10 MG/1
TABLET ORAL
Qty: 90 TABLET | Refills: 3 | Status: SHIPPED | OUTPATIENT
Start: 2022-11-02 | End: 2023-03-14 | Stop reason: SDUPTHER

## 2022-11-02 RX ORDER — LEVOTHYROXINE SODIUM 88 UG/1
TABLET ORAL DAILY
Qty: 90 TABLET | Refills: 3 | Status: SHIPPED | OUTPATIENT
Start: 2022-11-02

## 2022-11-15 ENCOUNTER — APPOINTMENT (OUTPATIENT)
Dept: MAMMOGRAPHY | Facility: HOSPITAL | Age: 62
End: 2022-11-15

## 2022-11-15 ENCOUNTER — APPOINTMENT (OUTPATIENT)
Dept: BONE DENSITY | Facility: HOSPITAL | Age: 62
End: 2022-11-15

## 2022-11-30 ENCOUNTER — TELEPHONE (OUTPATIENT)
Dept: INTERNAL MEDICINE | Facility: CLINIC | Age: 62
End: 2022-11-30

## 2022-12-01 ENCOUNTER — TELEPHONE (OUTPATIENT)
Dept: INTERNAL MEDICINE | Facility: CLINIC | Age: 62
End: 2022-12-01

## 2022-12-01 NOTE — TELEPHONE ENCOUNTER
I have called pt and let her know that [facility] was of no help to me, I let her know that Dr. Davis has given us permission, but that she would need to contact them and see if they are going to ship it to us. She is going to let us know.

## 2022-12-01 NOTE — TELEPHONE ENCOUNTER
I have called [facility] and they stated that they only called to let us know that the injection was approved and wanted to know if we had any questions.     I called pt and she is going to call them get this straightened out and let me know if I need to do anything else.

## 2022-12-01 NOTE — TELEPHONE ENCOUNTER
This sounds fine, I suspect we would get the prefilled syringe and not the autoinjector that she was using at home.  This recall that it has to be out of the fridge rater for 30 minutes before can be injected.  Thanks.

## 2022-12-01 NOTE — TELEPHONE ENCOUNTER
Caller: Melva Self    Relationship: Self    Best call back number: [phone]    What is the best time to reach you: ANY    Who are you requesting to speak with (clinical staff, provider,  specific staff member): CLINICAL    What was the call regarding: PATIENT CALLED BECAUSE HER NEW INSURANCE SENT A FORM TO THE OFFICE FOR APPROVAL Nucala 100 MG/ML solution auto-injector SO SHE CAN HAVE THE INJECTION IN OFFICE. PATIENT STATED SHE HAS NOT HAD THIS MEDICATION SINCE THE BEGINNING OF November AND SHE IS BEGINNING TO FEEL THE EFFECTS OF THIS.  PLEASE CALL AND ADVISE.     Do you require a callback: YES

## 2022-12-01 NOTE — TELEPHONE ENCOUNTER
Pt's insurance called here yesterday, I have not called them back yet. Pt states that she needs a provider to authorize that she can get this injection in our office. She states that it will only cost her $40.00 if we can administer. Please advise.

## 2023-03-14 RX ORDER — ESCITALOPRAM OXALATE 10 MG/1
10 TABLET ORAL EVERY MORNING
Qty: 90 TABLET | Refills: 3 | Status: SHIPPED | OUTPATIENT
Start: 2023-03-14

## 2023-06-01 RX ORDER — LEVOTHYROXINE SODIUM 88 UG/1
TABLET ORAL DAILY
Qty: 90 TABLET | Refills: 3 | OUTPATIENT
Start: 2023-06-01

## 2023-06-01 RX ORDER — ESCITALOPRAM OXALATE 10 MG/1
10 TABLET ORAL EVERY MORNING
Qty: 90 TABLET | Refills: 3 | OUTPATIENT
Start: 2023-06-01

## 2023-06-12 RX ORDER — LEVOTHYROXINE SODIUM 88 UG/1
88 TABLET ORAL DAILY
Qty: 90 TABLET | Refills: 3 | Status: SHIPPED | OUTPATIENT
Start: 2023-06-12

## 2023-06-12 NOTE — TELEPHONE ENCOUNTER
Caller: Aramis Melvamarcela Buchanan    Relationship: Self    Best call back number:    [phone]      Requested Prescriptions:   Requested Prescriptions     Pending Prescriptions Disp Refills   • levothyroxine (SYNTHROID, LEVOTHROID) 88 MCG tablet 90 tablet 3     Sig: Take  by mouth Daily.        Pharmacy where request should be sent: [facility]/PHARMACY #[id number] - [place], [place] - [address] [facility] [phone] [facility] [phone] FX     Last office visit with prescribing clinician: 4/1/2022   Last telemedicine visit with prescribing clinician: Visit date not found   Next office visit with prescribing clinician: 6/15/2023          Does the patient have less than a 3 day supply:  [x] Yes  [] No    Would you like a call back once the refill request has been completed: [] Yes [x] No    If the office needs to give you a call back, can they leave a voicemail: [x] Yes [] No    Uriel Ro   06/12/23 10:56 EDT

## 2023-06-15 ENCOUNTER — OFFICE VISIT (OUTPATIENT)
Dept: INTERNAL MEDICINE | Facility: CLINIC | Age: 63
End: 2023-06-15
Payer: MEDICARE

## 2023-06-15 VITALS
WEIGHT: 160 LBS | SYSTOLIC BLOOD PRESSURE: 140 MMHG | HEIGHT: 63 IN | BODY MASS INDEX: 28.35 KG/M2 | TEMPERATURE: 97.7 F | OXYGEN SATURATION: 98 % | HEART RATE: 65 BPM | DIASTOLIC BLOOD PRESSURE: 80 MMHG

## 2023-06-15 DIAGNOSIS — K21.9 GASTRO-ESOPHAGEAL REFLUX DISEASE WITHOUT ESOPHAGITIS: ICD-10-CM

## 2023-06-15 DIAGNOSIS — E55.9 VITAMIN D DEFICIENCY: ICD-10-CM

## 2023-06-15 DIAGNOSIS — Z00.00 WELL ADULT EXAM: Primary | ICD-10-CM

## 2023-06-15 DIAGNOSIS — E78.2 MIXED HYPERLIPIDEMIA: ICD-10-CM

## 2023-06-15 DIAGNOSIS — D72.18 EOSINOPHILIC GRANULOMATOSIS WITH POLYANGIITIS (EGPA): ICD-10-CM

## 2023-06-15 DIAGNOSIS — M81.0 POSTMENOPAUSAL BONE LOSS: ICD-10-CM

## 2023-06-15 DIAGNOSIS — E03.4 HYPOTHYROIDISM DUE TO ACQUIRED ATROPHY OF THYROID: ICD-10-CM

## 2023-06-15 DIAGNOSIS — Z12.31 BREAST CANCER SCREENING BY MAMMOGRAM: ICD-10-CM

## 2023-06-15 DIAGNOSIS — M30.1 EOSINOPHILIC GRANULOMATOSIS WITH POLYANGIITIS (EGPA): ICD-10-CM

## 2023-06-15 PROBLEM — J30.1 ALLERGIC RHINITIS DUE TO POLLEN: Status: RESOLVED | Noted: 2017-03-21 | Resolved: 2023-06-15

## 2023-06-15 PROBLEM — D72.819 LEUCOPENIA: Status: RESOLVED | Noted: 2022-04-01 | Resolved: 2023-06-15

## 2023-06-15 PROBLEM — M89.9 DISORDER OF BONE: Status: RESOLVED | Noted: 2022-04-01 | Resolved: 2023-06-15

## 2023-06-15 PROBLEM — G93.32 CHRONIC FATIGUE SYNDROME: Status: RESOLVED | Noted: 2022-04-01 | Resolved: 2023-06-15

## 2023-06-15 PROBLEM — J82.81 CHRONIC EOSINOPHILIC PNEUMONIA: Status: RESOLVED | Noted: 2022-04-01 | Resolved: 2023-06-15

## 2023-06-15 LAB
CHOLEST SERPL-MCNC: 262 MG/DL (ref 0–200)
HDLC SERPL-MCNC: 57 MG/DL (ref 40–60)
LDLC SERPL CALC-MCNC: 194 MG/DL (ref 0–100)
LDLC/HDLC SERPL: 3.35 {RATIO}
T4 FREE SERPL-MCNC: 0.79 NG/DL (ref 0.93–1.7)
TRIGL SERPL-MCNC: 69 MG/DL (ref 0–150)
TSH SERPL DL<=0.05 MIU/L-ACNC: 2.05 UIU/ML (ref 0.27–4.2)
VLDLC SERPL-MCNC: 11 MG/DL (ref 5–40)

## 2023-06-15 PROCEDURE — 1159F MED LIST DOCD IN RCRD: CPT | Performed by: INTERNAL MEDICINE

## 2023-06-15 PROCEDURE — 36415 COLL VENOUS BLD VENIPUNCTURE: CPT | Performed by: INTERNAL MEDICINE

## 2023-06-15 PROCEDURE — 1170F FXNL STATUS ASSESSED: CPT | Performed by: INTERNAL MEDICINE

## 2023-06-15 PROCEDURE — 84443 ASSAY THYROID STIM HORMONE: CPT | Performed by: INTERNAL MEDICINE

## 2023-06-15 PROCEDURE — 84439 ASSAY OF FREE THYROXINE: CPT | Performed by: INTERNAL MEDICINE

## 2023-06-15 PROCEDURE — 80061 LIPID PANEL: CPT | Performed by: INTERNAL MEDICINE

## 2023-06-15 PROCEDURE — 1160F RVW MEDS BY RX/DR IN RCRD: CPT | Performed by: INTERNAL MEDICINE

## 2023-06-15 PROCEDURE — 82306 VITAMIN D 25 HYDROXY: CPT | Performed by: INTERNAL MEDICINE

## 2023-06-15 PROCEDURE — 99397 PER PM REEVAL EST PAT 65+ YR: CPT | Performed by: INTERNAL MEDICINE

## 2023-06-15 NOTE — ASSESSMENT & PLAN NOTE
Patient with no dysphagia and controlled dyspepsia on chronic PPI as of 6/23. Patient stable to continue over-the-counter Nexium

## 2023-06-15 NOTE — ASSESSMENT & PLAN NOTE
LDL was 177 at last check, it is pending as of her 6/23 office visit.  She has been intolerant to statins due to myalgias, based on her level, may investigate whether newer agent is covered.

## 2023-06-15 NOTE — ASSESSMENT & PLAN NOTE
Patient is clinically euthyroid as of her 6/23 office visit.  Labs are pending, will continue 88 mcg of Synthroid daily for now, make further recommendations after labs back.

## 2023-06-15 NOTE — ASSESSMENT & PLAN NOTE
The below note was reviewed from her 5/31/2023 office visit:    Continue Nucala (mepolizumab), 300 mg subcutaneously, every 4 weeks. Refills as needed.   Please proceed to get labs today, we will inform you of results in 1-2 weeks  Your lung function testing was stable to improved from previous lung test, would recommend re-establishing with Pulmonary Medicine if cough persists  Please follow-up with your primary care doctor regarding fatigue.  She should call my office if acute problems with upper extremities is noted. In this case, she could potentially require an acute pulse of steroids, e.g. 30 mg every day for 5 days followed by a 1 month taper.

## 2023-06-15 NOTE — PROGRESS NOTES
"Chief Complaint  Hypothyroidism and Follow-up (Pt states that this is routine, she had labs in [place] at the clinic and Rupinder jeanine some this afternoon. She has no new issues. )    Subjective      Melva Self presents to [facility]    History of Present Illness  Patient pleasant 63-year-old female with underlying hypothyroidism, untreated hyperlipidemia, chronic leukopenia, among others, recently diagnosed with EGPA followed by specialist, who is coming in 6/23 for annual exam.  We reviewed her med list in detail, have arranged for comprehensive laboratory studies, and made recommendations in regard to some screening studies.    Review of Systems   Constitutional:  Negative for appetite change, fatigue and fever.   HENT:  Negative for congestion and ear pain.    Eyes:  Negative for blurred vision.   Respiratory:  Negative for cough, chest tightness, shortness of breath and wheezing.    Cardiovascular:  Negative for chest pain, palpitations and leg swelling.   Gastrointestinal:  Negative for abdominal pain.   Genitourinary:  Negative for difficulty urinating, dysuria and hematuria.   Musculoskeletal:  Negative for arthralgias and gait problem.   Skin:  Negative for skin lesions.   Neurological:  Negative for syncope, memory problem and confusion.   Psychiatric/Behavioral:  Negative for self-injury and depressed mood.      Objective   Vital Signs:   /80   Pulse 65   Temp 97.7 °F (36.5 °C) (Skin)   Ht 160 cm (62.99\")   Wt 72.6 kg (160 lb)   SpO2 98%   BMI 28.35 kg/m²           Physical Exam  Vitals and nursing note reviewed.   Constitutional:       General: She is not in acute distress.     Appearance: Normal appearance. She is not toxic-appearing.   HENT:      Head: Atraumatic.      Right Ear: External ear normal.      Left Ear: External ear normal.      Nose: Nose normal.      Mouth/Throat:      Mouth: Mucous membranes are moist.   Eyes:      General:         Right " eye: No discharge.         Left eye: No discharge.      Extraocular Movements: Extraocular movements intact.      Pupils: Pupils are equal, round, and reactive to light.   Neck:      Comments: No carotid bruits.  Cardiovascular:      Rate and Rhythm: Normal rate and regular rhythm.      Pulses: Normal pulses.      Heart sounds: Normal heart sounds. No murmur heard.    No gallop.      Comments: Heart tones normal, no ectopy, no S3.  Pulmonary:      Effort: Pulmonary effort is normal. No respiratory distress.      Breath sounds: No wheezing, rhonchi or rales.      Comments: Lung fields clear bilaterally, no bronchospasm.  Abdominal:      General: There is no distension.      Palpations: Abdomen is soft. There is no mass.      Tenderness: There is no abdominal tenderness. There is no guarding.   Musculoskeletal:         General: No swelling or tenderness.      Cervical back: No tenderness.      Right lower leg: No edema.      Left lower leg: No edema.      Comments: No edema.   Skin:     General: Skin is warm and dry.      Findings: No rash.   Neurological:      General: No focal deficit present.      Mental Status: She is alert and oriented to person, place, and time. Mental status is at baseline.      Motor: No weakness.      Gait: Gait normal.   Psychiatric:         Mood and Affect: Mood normal.         Thought Content: Thought content normal.        Result Review   The following data was reviewed by: Baltazar Davis MD on 04/01/2022:  [x] Laboratory  [] Microbiology  [] Radiology  [] EKG/telemetry  [] Cardiology/Vascular  [] Pathology  [x] Old records             Assessment and Plan   Diagnoses and all orders for this visit:    1. Well adult exam (Primary)  Overview:  Preventive measures: were reviewed with the patient at this office visit. They included but were not limited to discussions in regards to vaccines outstanding, auto safety with seat belts and other assistive devices, fall prevention, and routine  screening studies.    Exercise: Patient not able to exercise intensely as of 6/23 office visit secondary to pulmonary issues still, but she has no ischemic issues with routine activity at home.  Is able to do short hikes at least.  Comprehensive labs: Reviewed her 5/23 labs from [place], her labs are pending from earlier today.    Covid vaccine: Up-to-date as of 6/23  Other vaccines: Patient would rather not as of 4/22.    MMG: 10/6/2020 per me.---> Ordered at her 6/23 OV  Colon: 7/18 = wnl = 10 years.        Orders:  -     CBC & Differential; Future  -     Hepatitis C Antibody; Future  -     Vitamin B12 anemia; Future  -     Folate anemia; Future    2. Mixed hyperlipidemia  Assessment & Plan:  LDL was 177 at last check, it is pending as of her 6/23 office visit.  She has been intolerant to statins due to myalgias, based on her level, may investigate whether newer agent is covered.    Orders:  -     Lipid panel; Future  -     Lipid panel  -     Comprehensive Metabolic Panel; Future  -     Lipid Panel; Future    3. Hypothyroidism due to acquired atrophy of thyroid  Assessment & Plan:  Patient is clinically euthyroid as of her 6/23 office visit.  Labs are pending, will continue 88 mcg of Synthroid daily for now, make further recommendations after labs back.    Orders:  -     TSH+Free T4; Future  -     TSH+Free T4  -     TSH+Free T4; Future    4. Eosinophilic granulomatosis with polyangiitis (EGPA)  Overview:  Patient is being followed by Dr. Silver with [facility].    She is recently been started on Nucala per his direction.    Assessment & Plan:  The below note was reviewed from her 5/31/2023 office visit:    Continue Nucala (mepolizumab), 300 mg subcutaneously, every 4 weeks. Refills as needed.   Please proceed to get labs today, we will inform you of results in 1-2 weeks  Your lung function testing was stable to improved from previous lung test, would recommend re-establishing with Pulmonary  Medicine if cough persists  Please follow-up with your primary care doctor regarding fatigue.  She should call my office if acute problems with upper extremities is noted. In this case, she could potentially require an acute pulse of steroids, e.g. 30 mg every day for 5 days followed by a 1 month taper.       5. Vitamin D deficiency  -     Vitamin D 25 hydroxy; Future  -     Vitamin D 25 hydroxy  -     Vitamin D,25-Hydroxy; Future    6. Postmenopausal bone loss  -     DEXA Bone Density Axial; Future    7. Breast cancer screening by mammogram  -     Mammo Screening Digital Tomosynthesis Bilateral With CAD; Future    8. Gastro-esophageal reflux disease without esophagitis  Assessment & Plan:  Patient with no dysphagia and controlled dyspepsia on chronic PPI as of 6/23. Patient stable to continue over-the-counter Nexium         --   --  OLDER NOTES:   ANNUAL PHYSICAL 10/20 = no ischemia with run=only able to walk as of 10/20 OV; d/w all labs in detail.  --  HYPOTHYROIDISM was on 1/2 of 125 qd, so try 75 qd now...only take 1/2 pill once a week=70 mcg qd...try 75 qd and call...tried to increase to 88, but felt anxious, took 1/2 pill once a week, but TSH low, so try 1/2 2x/wk = 75 dose again...stable...agree with 88 qd given TSH 4.6...0.3 already, so will take with food if sx's or lower RTO...3.5 with wt gain as of 3/18 OV, so rec repeat 3 mo and call...0.4 with no sxs 1/19...need labs 11/19---> THS 0.4 in 10/20 despite wt gain on the steroids.  --  LIPIDS stable w/o tx...ditto 11/14...bad 11/15 and will tx on RTO if same ballpark...no better with good thyroid, so tx now 4/16 (FH of same)... on it, but stopped due to myalgias and is rubbing lemon oil on feet?... apparently off med past 6 mo and still rubbing oil on feet...187 as of 3/18 OV is off the oil, but is also with wt gain and bump in TSH; not interested in meds...177 but failed med as above---> defer for now 10/20.  --  LEUKOPENIA remains stable, but  Lymph % is trending up, was 31-37% 5 yrs ago, so Heme to eval again if no better RTO...it's better, so defer...3.4...2.6 is her nati, but all lines nl %, so repeat in 3-4 mo and to Heme then if same...better 5/19 and ok 7/19 when was sick; defer consult---> 7.3 on steroids.  FE DEF ANEMIA is stable as well on OTC iron...neg 8/16...12.9---> 14 in 10/20.  --  [facility] and will call...intolerant and no help low dose at least; he has sex addicition, she is going to see Jessica Castro... her due to non-compliance with f/u; on lexapro and no new issues--->stable as of 10/20 OV.  --  TINNITUS = hearing pulse, bilat, no other sx's, so defer for now.   A.R. with ? RAD and will add singulair...no new c/o...back on it due to cough this winter.  --  COUGH = ? GERD b/c occurs with food and sisters had this as well; eval/tx per orders=PPI/H2 combo since no help PPI past 2 weeks and zyrtec/benadryl; ? MBSS if no help...at 3/18 OV=c/o pain/pressure in AM in chest and also at night; no dysphagia; rec PPI for a month and then H2; call if no benefit...had to go back on PPI as of 11/19 OV.  --  EOSINOPHILIA per clinic in [place] Immunologist; no true dx as of 10/20; has been on steroids a year now and is on Prednisone 10 mg qd with 1 mg/mo taper; I reviewed records this OV; she has sxs of chest pressure/fatigue/dyspnea and that lets her know to not lower steroids further.  BILATERAL PNA 8/18=s/p Levaquin for 10 days per me; no temps, but pleuritic CP is coming back just a few days off abx and lisa when lying down at night;   ? CHF per CHEST CT and still with ORNELAS, so will get another CXR and labs; (BNP neg in [facility]); consider ECHO to r/o effusion given CMG...get ECHO now just to be sure since sxs are lingering on---> recurrent 7/19 and has f/u CT flora next month as of 11/19 OV;   --  CHEST PAIN=under ribs on R, then radiated into back, and now wraps all the way around, worse at rest=better  standing up; + pleuritic component; LGT, no cough per say, but hurts when she does; exam with ? decreased BS in bases, but no cords and no edema; needs CXR, CBC/d-dimer and call for results; if neg, ok to go to Chiropractor...is due to persistent effusion, so will eval for thoaracentesis; is on aleve for this as well---> s/p tap/bronch per Dr Peterson; ended up on steroids for a few months=off meds by Brenda; still with mild dyspnea.  --  LBP with radiculoapthy on left with flims per chiropractor noted.  --  VIT D DEF and d/w (again) take 2K qd please...try 3K qd again...admits to non-compliance...28 = same issue...30.   BMD   --  MMG 8/5/19 per me.  COLON/EGD 7/18 = neg by report.  REFUSES SHOTS.  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    Follow Up   Return in about 1 year (around 6/15/2024).  Patient was given instructions and counseling regarding her condition or for health maintenance advice. Please see specific information pulled into the AVS if appropriate.

## 2023-06-16 DIAGNOSIS — E78.2 MIXED HYPERLIPIDEMIA: Primary | ICD-10-CM

## 2023-06-16 LAB — 25(OH)D3 SERPL-MCNC: 23.8 NG/ML (ref 30–100)

## 2023-06-16 RX ORDER — BEMPEDOIC ACID 180 MG/1
180 TABLET, FILM COATED ORAL DAILY
Qty: 90 TABLET | Refills: 1 | Status: SHIPPED | OUTPATIENT
Start: 2023-06-16

## 2023-06-27 RX ORDER — BEMPEDOIC ACID 180 MG/1
180 TABLET, FILM COATED ORAL DAILY
Qty: 90 TABLET | Refills: 1 | Status: CANCELLED | OUTPATIENT
Start: 2023-06-27

## 2024-05-30 RX ORDER — LEVOTHYROXINE SODIUM 88 UG/1
88 TABLET ORAL DAILY
Qty: 90 TABLET | Refills: 3 | Status: SHIPPED | OUTPATIENT
Start: 2024-05-30

## 2024-05-31 RX ORDER — ESCITALOPRAM OXALATE 10 MG/1
10 TABLET ORAL EVERY MORNING
Qty: 90 TABLET | Refills: 3 | Status: SHIPPED | OUTPATIENT
Start: 2024-05-31

## 2024-06-11 ENCOUNTER — LAB (OUTPATIENT)
Dept: LAB | Facility: HOSPITAL | Age: 64
End: 2024-06-11
Payer: MEDICARE

## 2024-06-11 DIAGNOSIS — Z00.00 WELL ADULT EXAM: ICD-10-CM

## 2024-06-11 DIAGNOSIS — E03.4 HYPOTHYROIDISM DUE TO ACQUIRED ATROPHY OF THYROID: ICD-10-CM

## 2024-06-11 DIAGNOSIS — E55.9 VITAMIN D DEFICIENCY: ICD-10-CM

## 2024-06-11 DIAGNOSIS — E78.2 MIXED HYPERLIPIDEMIA: ICD-10-CM

## 2024-06-11 LAB
25(OH)D3 SERPL-MCNC: 37.6 NG/ML (ref 30–100)
ALBUMIN SERPL-MCNC: 4.1 G/DL (ref 3.5–5.2)
ALBUMIN/GLOB SERPL: 1.8 G/DL
ALP SERPL-CCNC: 99 U/L (ref 39–117)
ALT SERPL W P-5'-P-CCNC: 27 U/L (ref 1–33)
ANION GAP SERPL CALCULATED.3IONS-SCNC: 8 MMOL/L (ref 5–15)
AST SERPL-CCNC: 20 U/L (ref 1–32)
BASOPHILS # BLD AUTO: 0.02 10*3/MM3 (ref 0–0.2)
BASOPHILS NFR BLD AUTO: 0.5 % (ref 0–1.5)
BILIRUB SERPL-MCNC: 0.3 MG/DL (ref 0–1.2)
BUN SERPL-MCNC: 26 MG/DL (ref 8–23)
BUN/CREAT SERPL: 36.6 (ref 7–25)
CALCIUM SPEC-SCNC: 9 MG/DL (ref 8.6–10.5)
CHLORIDE SERPL-SCNC: 104 MMOL/L (ref 98–107)
CHOLEST SERPL-MCNC: 246 MG/DL (ref 0–200)
CK SERPL-CCNC: 128 U/L (ref 20–180)
CO2 SERPL-SCNC: 28 MMOL/L (ref 22–29)
CREAT SERPL-MCNC: 0.71 MG/DL (ref 0.57–1)
DEPRECATED RDW RBC AUTO: 39 FL (ref 37–54)
EGFRCR SERPLBLD CKD-EPI 2021: 93.9 ML/MIN/1.73
EOSINOPHIL # BLD AUTO: 0 10*3/MM3 (ref 0–0.4)
EOSINOPHIL NFR BLD AUTO: 0 % (ref 0.3–6.2)
ERYTHROCYTE [DISTWIDTH] IN BLOOD BY AUTOMATED COUNT: 13.6 % (ref 12.3–15.4)
FOLATE SERPL-MCNC: 7.57 NG/ML (ref 4.78–24.2)
GLOBULIN UR ELPH-MCNC: 2.3 GM/DL
GLUCOSE SERPL-MCNC: 91 MG/DL (ref 65–99)
HCT VFR BLD AUTO: 40.7 % (ref 34–46.6)
HCV AB SER QL: NORMAL
HDLC SERPL-MCNC: 66 MG/DL (ref 40–60)
HGB BLD-MCNC: 13 G/DL (ref 12–15.9)
IMM GRANULOCYTES # BLD AUTO: 0.01 10*3/MM3 (ref 0–0.05)
IMM GRANULOCYTES NFR BLD AUTO: 0.2 % (ref 0–0.5)
LDLC SERPL CALC-MCNC: 170 MG/DL (ref 0–100)
LDLC/HDLC SERPL: 2.54 {RATIO}
LYMPHOCYTES # BLD AUTO: 1.23 10*3/MM3 (ref 0.7–3.1)
LYMPHOCYTES NFR BLD AUTO: 27.7 % (ref 19.6–45.3)
MCH RBC QN AUTO: 25.6 PG (ref 26.6–33)
MCHC RBC AUTO-ENTMCNC: 31.9 G/DL (ref 31.5–35.7)
MCV RBC AUTO: 80.1 FL (ref 79–97)
MONOCYTES # BLD AUTO: 0.27 10*3/MM3 (ref 0.1–0.9)
MONOCYTES NFR BLD AUTO: 6.1 % (ref 5–12)
NEUTROPHILS NFR BLD AUTO: 2.91 10*3/MM3 (ref 1.7–7)
NEUTROPHILS NFR BLD AUTO: 65.5 % (ref 42.7–76)
NRBC BLD AUTO-RTO: 0 /100 WBC (ref 0–0.2)
PLATELET # BLD AUTO: 258 10*3/MM3 (ref 140–450)
PMV BLD AUTO: 9.5 FL (ref 6–12)
POTASSIUM SERPL-SCNC: 4.3 MMOL/L (ref 3.5–5.2)
PROT SERPL-MCNC: 6.4 G/DL (ref 6–8.5)
RBC # BLD AUTO: 5.08 10*6/MM3 (ref 3.77–5.28)
SODIUM SERPL-SCNC: 140 MMOL/L (ref 136–145)
T4 FREE SERPL-MCNC: 1.44 NG/DL (ref 0.92–1.68)
TRIGL SERPL-MCNC: 61 MG/DL (ref 0–150)
TSH SERPL DL<=0.05 MIU/L-ACNC: 0.13 UIU/ML (ref 0.27–4.2)
VIT B12 BLD-MCNC: 363 PG/ML (ref 211–946)
VLDLC SERPL-MCNC: 10 MG/DL (ref 5–40)
WBC NRBC COR # BLD AUTO: 4.44 10*3/MM3 (ref 3.4–10.8)

## 2024-06-11 PROCEDURE — 84439 ASSAY OF FREE THYROXINE: CPT

## 2024-06-11 PROCEDURE — 82607 VITAMIN B-12: CPT

## 2024-06-11 PROCEDURE — 82746 ASSAY OF FOLIC ACID SERUM: CPT

## 2024-06-11 PROCEDURE — 80061 LIPID PANEL: CPT

## 2024-06-11 PROCEDURE — 80053 COMPREHEN METABOLIC PANEL: CPT

## 2024-06-11 PROCEDURE — 86803 HEPATITIS C AB TEST: CPT

## 2024-06-11 PROCEDURE — 85025 COMPLETE CBC W/AUTO DIFF WBC: CPT

## 2024-06-11 PROCEDURE — 82550 ASSAY OF CK (CPK): CPT

## 2024-06-11 PROCEDURE — 84443 ASSAY THYROID STIM HORMONE: CPT

## 2024-06-11 PROCEDURE — 36415 COLL VENOUS BLD VENIPUNCTURE: CPT

## 2024-06-11 PROCEDURE — 82306 VITAMIN D 25 HYDROXY: CPT

## 2024-06-19 ENCOUNTER — OFFICE VISIT (OUTPATIENT)
Dept: INTERNAL MEDICINE | Age: 64
End: 2024-06-19
Payer: MEDICARE

## 2024-06-19 VITALS
DIASTOLIC BLOOD PRESSURE: 78 MMHG | SYSTOLIC BLOOD PRESSURE: 132 MMHG | BODY MASS INDEX: 25.73 KG/M2 | HEIGHT: 63 IN | HEART RATE: 72 BPM | WEIGHT: 145.2 LBS | OXYGEN SATURATION: 97 % | TEMPERATURE: 96.4 F

## 2024-06-19 DIAGNOSIS — Z00.00 MEDICARE ANNUAL WELLNESS VISIT, SUBSEQUENT: Primary | ICD-10-CM

## 2024-06-19 DIAGNOSIS — Z12.31 BREAST CANCER SCREENING BY MAMMOGRAM: ICD-10-CM

## 2024-06-19 DIAGNOSIS — E55.9 VITAMIN D DEFICIENCY: ICD-10-CM

## 2024-06-19 DIAGNOSIS — E03.4 HYPOTHYROIDISM DUE TO ACQUIRED ATROPHY OF THYROID: ICD-10-CM

## 2024-06-19 DIAGNOSIS — E78.2 MIXED HYPERLIPIDEMIA: ICD-10-CM

## 2024-06-19 PROCEDURE — 1170F FXNL STATUS ASSESSED: CPT | Performed by: INTERNAL MEDICINE

## 2024-06-19 PROCEDURE — 99213 OFFICE O/P EST LOW 20 MIN: CPT | Performed by: INTERNAL MEDICINE

## 2024-06-19 PROCEDURE — G0439 PPPS, SUBSEQ VISIT: HCPCS | Performed by: INTERNAL MEDICINE

## 2024-06-19 PROCEDURE — 1159F MED LIST DOCD IN RCRD: CPT | Performed by: INTERNAL MEDICINE

## 2024-06-19 PROCEDURE — 1160F RVW MEDS BY RX/DR IN RCRD: CPT | Performed by: INTERNAL MEDICINE

## 2024-06-19 RX ORDER — ESOMEPRAZOLE MAGNESIUM 40 MG/1
40 CAPSULE, DELAYED RELEASE ORAL
Qty: 90 CAPSULE | Refills: 1 | Status: SHIPPED | OUTPATIENT
Start: 2024-06-19 | End: 2024-06-21 | Stop reason: SDUPTHER

## 2024-06-19 RX ORDER — LEVOTHYROXINE SODIUM 0.07 MG/1
75 TABLET ORAL DAILY
Qty: 90 TABLET | Refills: 3 | Status: SHIPPED | OUTPATIENT
Start: 2024-06-19

## 2024-06-19 NOTE — PROGRESS NOTES
The ABCs of the Annual Wellness Visit  Subsequent Medicare Wellness Visit    Subjective      Melva Self is a 64 y.o. female who presents for a Subsequent Medicare Wellness Visit.    The following portions of the patient's history were reviewed and   updated as appropriate: allergies, current medications, past family history, past medical history, past social history, past surgical history, and problem list.    Compared to one year ago, the patient feels her physical   health is the same.    Compared to one year ago, the patient feels her mental   health is the same.    Recent Hospitalizations:  She was not admitted to the hospital during the last year.       Current Medical Providers:  Patient Care Team:  Baltazar Davis MD as PCP - General (Internal Medicine)    Outpatient Medications Prior to Visit   Medication Sig Dispense Refill    esomeprazole (nexIUM) 20 MG capsule Take 1 capsule by mouth Daily.      Nucala 100 MG/ML solution auto-injector       levothyroxine (SYNTHROID, LEVOTHROID) 88 MCG tablet TAKE 1 TABLET BY MOUTH EVERY DAY 90 tablet 3    escitalopram (LEXAPRO) 10 MG tablet TAKE 1 TABLET BY MOUTH EVERY DAY IN THE MORNING (Patient not taking: Reported on 6/19/2024) 90 tablet 3    Bempedoic Acid (Nexletol) 180 MG tablet Take 1 tablet by mouth Daily. (Patient not taking: Reported on 6/19/2024) 90 tablet 1     No facility-administered medications prior to visit.       No opioid medication identified on active medication list. I have reviewed chart for other potential  high risk medication/s and harmful drug interactions in the elderly.        Aspirin is not on active medication list.  Aspirin use is not indicated based on review of current medical condition/s. Risk of harm outweighs potential benefits.  .    Patient Active Problem List   Diagnosis    Vitamin D deficiency    Recurrent major depression in partial remission    Mixed hyperlipidemia    Hypothyroidism due to acquired atrophy of thyroid     "Well adult exam    Cardiomegaly    Eosinophilic granulomatosis with polyangiitis (EGPA)    Gastro-esophageal reflux disease without esophagitis    Medicare annual wellness visit, subsequent     Advance Care Planning   Advance Care Planning     Advance Directive is not on file.  ACP discussion was held with the patient during this visit. Patient does not have an advance directive, information provided.     Objective    Vitals:    24 1609   BP: 132/78   Pulse: 72   Temp: 96.4 °F (35.8 °C)   SpO2: 97%   Weight: 70.8 kg (156 lb)   Height: 160 cm (63\")     Estimated body mass index is 27.63 kg/m² as calculated from the following:    Height as of this encounter: 160 cm (63\").    Weight as of this encounter: 70.8 kg (156 lb).    BMI is >= 25 and <30. (Overweight) The following options were offered after discussion;: exercise counseling/recommendations and nutrition counseling/recommendations      Does the patient have evidence of cognitive impairment?   No    Lab Results   Component Value Date    TRIG 61 2024    HDL 66 (H) 2024     (H) 2024    VLDL 10 2024          HEALTH RISK ASSESSMENT    Smoking Status:  Social History     Tobacco Use   Smoking Status Never   Smokeless Tobacco Never     Alcohol Consumption:  Social History     Substance and Sexual Activity   Alcohol Use No     Fall Risk Screen:    DAVIDADI Fall Risk Assessment was completed, and patient is at LOW risk for falls.Assessment completed on:2024    Depression Screenin/19/2024     4:10 PM   PHQ-2/PHQ-9 Depression Screening   Little Interest or Pleasure in Doing Things 0-->not at all   Feeling Down, Depressed or Hopeless 0-->not at all   PHQ-9: Brief Depression Severity Measure Score 0       Health Habits and Functional and Cognitive Screenin/19/2024     4:10 PM   Functional & Cognitive Status   Do you have difficulty preparing food and eating? No   Do you have difficulty bathing yourself, getting " dressed or grooming yourself? No   Do you have difficulty using the toilet? No   Do you have difficulty moving around from place to place? No   Do you have trouble with steps or getting out of a bed or a chair? No   Current Diet Well Balanced Diet   Dental Exam Up to date   Eye Exam Up to date   Exercise (times per week) 3 times per week   Current Exercises Include Walking;Stair Step Machine;Yard Work   Do you need help using the phone?  No   Are you deaf or do you have serious difficulty hearing?  No   Do you need help to go to places out of walking distance? No   Do you need help shopping? No   Do you need help preparing meals?  No   Do you need help with housework?  No   Do you need help with laundry? No   Do you need help taking your medications? No   Do you need help managing money? No   Do you ever drive or ride in a car without wearing a seat belt? No   Have you felt unusual stress, anger or loneliness in the last month? No   Who do you live with? Spouse   If you need help, do you have trouble finding someone available to you? No   Have you been bothered in the last four weeks by sexual problems? No   Do you have difficulty concentrating, remembering or making decisions? No       Age-appropriate Screening Schedule:  Refer to the list below for future screening recommendations based on patient's age, sex and/or medical conditions. Orders for these recommended tests are listed in the plan section. The patient has been provided with a written plan.    Health Maintenance   Topic Date Due    TDAP/TD VACCINES (1 - Tdap) Never done    ZOSTER VACCINE (1 of 2) Never done    RSV Vaccine - Adults (1 - 1-dose 60+ series) Never done    ANNUAL WELLNESS VISIT  Never done    MAMMOGRAM  10/06/2022    DXA SCAN  10/06/2022    Pneumococcal Vaccine 65+ (1 of 1 - PCV) Never done    COVID-19 Vaccine (3 - 2023-24 season) 09/01/2023    BMI FOLLOWUP  06/15/2024    INFLUENZA VACCINE  08/01/2024    LIPID PANEL  06/11/2025    COLORECTAL  CANCER SCREENING  07/09/2028    HEPATITIS C SCREENING  Completed                  CMS Preventative Services Quick Reference  Risk Factors Identified During Encounter:    None Identified    The above risks/problems have been discussed with the patient.  Pertinent information has been shared with the patient in the After Visit Summary.    Diagnoses and all orders for this visit:    1. Medicare annual wellness visit, subsequent (Primary)  Assessment & Plan:  AWV completed 6/24.  Patient remains very active and independent.  No falls and no hospitalizations in the past year.  We will get her a copy of the advance directive packet.    Orders:  -     CBC & Differential; Future  -     Urinalysis With Culture If Indicated -; Future  -     Hemoglobin A1c; Future    2. Vitamin D deficiency  Assessment & Plan:  Her vitamin D level is up from 23 to 37 as of her 6/24 OV.  She stable to continue with low-dose over-the-counter supplementation.    Orders:  -     Vitamin D,25-Hydroxy; Future    3. Mixed hyperlipidemia  Assessment & Plan:  LDL is stuck at 170 as of 6/24 OV.  Is a little better than it was last year, but still not at goal.  She has been intolerant to statins and did not feel comfortable taking Nexletol.  Discussed with patient she should consider the calcium score, will asked them to call to let her know what it would cost.    Orders:  -     CT Cardiac Calcium Score Without Dye; Future  -     Comprehensive Metabolic Panel; Future  -     Lipid Panel; Future    4. Hypothyroidism due to acquired atrophy of thyroid  Assessment & Plan:  Patient's TSH is down from 2.1 to 0.12 as of her 6/24 OV.  She has been losing weight, has over 20% weight off, some 20 pounds over the past year.  She is asymptomatic, not having any palpitations etc., but intends to lose more weight, so we should try backing her off to 75.  Will check it again in 3 to 4 months, I asked her to call for the results.    Orders:  -     TSH; Future  -      TSH+Free T4; Future    Other orders  -     levothyroxine (SYNTHROID, LEVOTHROID) 75 MCG tablet; Take 1 tablet by mouth Daily.  Dispense: 90 tablet; Refill: 3        Follow Up:   Next Medicare Wellness visit to be scheduled in 1 year.      An After Visit Summary and PPPS were made available to the patient.

## 2024-06-19 NOTE — PROGRESS NOTES
"Chief Complaint  Medicare Wellness-subsequent    Subjective      Melva Self presents to [facility] INTERNAL MEDICINE    History of Present Illness  Patient pleasant 63-year-old female with underlying hypothyroidism, untreated hyperlipidemia, chronic leukopenia, among others, recently diagnosed with EGPA followed by specialist, who is coming in 6/23 for annual exam.  We reviewed her med list in detail, have arranged for comprehensive laboratory studies, and made recommendations in regard to some screening studies.    Review of Systems   Constitutional:  Negative for appetite change, fatigue and fever.   HENT:  Negative for congestion and ear pain.    Eyes:  Negative for blurred vision.   Respiratory:  Negative for cough, chest tightness, shortness of breath and wheezing.    Cardiovascular:  Negative for chest pain, palpitations and leg swelling.   Gastrointestinal:  Negative for abdominal pain.   Genitourinary:  Negative for difficulty urinating, dysuria and hematuria.   Musculoskeletal:  Negative for arthralgias and gait problem.   Skin:  Negative for skin lesions.   Neurological:  Negative for syncope, memory problem and confusion.   Psychiatric/Behavioral:  Negative for self-injury and depressed mood.        Objective   Vital Signs:   /78   Pulse 72   Temp 96.4 °F (35.8 °C)   Ht 160 cm (63\")   Wt 70.8 kg (156 lb)   SpO2 97%   BMI 27.63 kg/m²           Physical Exam  Vitals and nursing note reviewed.   Constitutional:       General: She is not in acute distress.     Appearance: Normal appearance. She is not toxic-appearing.   HENT:      Head: Atraumatic.      Right Ear: External ear normal.      Left Ear: External ear normal.      Nose: Nose normal.      Mouth/Throat:      Mouth: Mucous membranes are moist.   Eyes:      General:         Right eye: No discharge.         Left eye: No discharge.      Extraocular Movements: Extraocular movements intact.      Pupils: Pupils are " equal, round, and reactive to light.   Neck:      Comments: No carotid bruits.  Cardiovascular:      Rate and Rhythm: Normal rate and regular rhythm.      Pulses: Normal pulses.      Heart sounds: Normal heart sounds. No murmur heard.     No gallop.      Comments: Heart tones normal, no ectopy, no S3.  Pulmonary:      Effort: Pulmonary effort is normal. No respiratory distress.      Breath sounds: No wheezing, rhonchi or rales.      Comments: Lung fields clear bilaterally, no bronchospasm.  Abdominal:      General: There is no distension.      Palpations: Abdomen is soft. There is no mass.      Tenderness: There is no abdominal tenderness. There is no guarding.   Musculoskeletal:         General: No swelling or tenderness.      Cervical back: No tenderness.      Right lower leg: No edema.      Left lower leg: No edema.      Comments: No edema.   Skin:     General: Skin is warm and dry.      Findings: No rash.   Neurological:      General: No focal deficit present.      Mental Status: She is alert and oriented to person, place, and time. Mental status is at baseline.      Motor: No weakness.      Gait: Gait normal.   Psychiatric:         Mood and Affect: Mood normal.         Thought Content: Thought content normal.          Result Review   The following data was reviewed by: Baltazar Davis MD on 04/01/2022:  [x] Laboratory  [] Microbiology  [] Radiology  [] EKG/telemetry  [] Cardiology/Vascular  [] Pathology  [x] Old records             Assessment and Plan   Diagnoses and all orders for this visit:    1. Medicare annual wellness visit, subsequent (Primary)  Assessment & Plan:  AWV completed 6/24.  Patient remains very active and independent.  No falls and no hospitalizations in the past year.  We will get her a copy of the advance directive packet.    Orders:  -     CBC & Differential; Future  -     Urinalysis With Culture If Indicated -; Future  -     Hemoglobin A1c; Future    2. Vitamin D deficiency  Assessment &  Plan:  Her vitamin D level is up from 23 to 37 as of her 6/24 OV.  She stable to continue with low-dose over-the-counter supplementation.    Orders:  -     Vitamin D,25-Hydroxy; Future    3. Mixed hyperlipidemia  Assessment & Plan:  LDL is stuck at 170 as of 6/24 OV.  Is a little better than it was last year, but still not at goal.  She has been intolerant to statins and did not feel comfortable taking Nexletol.  Discussed with patient she should consider the calcium score, will asked them to call to let her know what it would cost.    Orders:  -     CT Cardiac Calcium Score Without Dye; Future  -     Comprehensive Metabolic Panel; Future  -     Lipid Panel; Future    4. Hypothyroidism due to acquired atrophy of thyroid  Assessment & Plan:  Patient's TSH is down from 2.1 to 0.12 as of her 6/24 OV.  She has been losing weight, has over 20% weight off, some 20 pounds over the past year.  She is asymptomatic, not having any palpitations etc., but intends to lose more weight, so we should try backing her off to 75.  Will check it again in 3 to 4 months, I asked her to call for the results.    Orders:  -     TSH; Future  -     TSH+Free T4; Future    5. Breast cancer screening by mammogram  -     Mammo Screening Digital Tomosynthesis Bilateral With CAD; Future    Other orders  -     levothyroxine (SYNTHROID, LEVOTHROID) 75 MCG tablet; Take 1 tablet by mouth Daily.  Dispense: 90 tablet; Refill: 3  -     esomeprazole (nexIUM) 40 MG capsule; Take 1 capsule by mouth Every Morning Before Breakfast.  Dispense: 90 capsule; Refill: 1        --   --  OLDER NOTES:   ANNUAL PHYSICAL 10/20 = no ischemia with run=only able to walk as of 10/20 OV; d/w all labs in detail.  --  HYPOTHYROIDISM was on 1/2 of 125 qd, so try 75 qd now...only take 1/2 pill once a week=70 mcg qd...try 75 qd and call...tried to increase to 88, but felt anxious, took 1/2 pill once a week, but TSH low, so try 1/2 2x/wk = 75 dose again...stable...agree with 88 qd  given TSH 4.6...0.3 already, so will take with food if sx's or lower RTO...3.5 with wt gain as of 3/18 OV, so rec repeat 3 mo and call...0.4 with no sxs 1/19...need labs 11/19---> THS 0.4 in 10/20 despite wt gain on the steroids.  --  LIPIDS stable w/o tx...ditto 11/14...bad 11/15 and will tx on RTO if same ballpark...no better with good thyroid, so tx now 4/16 (FH of same)... on it, but stopped due to myalgias and is rubbing lemon oil on feet?... apparently off med past 6 mo and still rubbing oil on feet...187 as of 3/18 OV is off the oil, but is also with wt gain and bump in TSH; not interested in meds...177 but failed med as above---> defer for now 10/20.  --  LEUKOPENIA remains stable, but Lymph % is trending up, was 31-37% 5 yrs ago, so Heme to eval again if no better RTO...it's better, so defer...3.4...2.6 is her nati, but all lines nl %, so repeat in 3-4 mo and to Heme then if same...better 5/19 and ok 7/19 when was sick; defer consult---> 7.3 on steroids.  FE DEF ANEMIA is stable as well on OTC iron...neg 8/16...12.9---> 14 in 10/20.  --  DEPRESSION and d/w [facility] of action SSRI and will call...intolerant and no help low dose at least; he has sex addicition, she is going to see Jessica Castro... her due to non-compliance with f/u; on lexapro and no new issues--->stable as of 10/20 OV.  --  TINNITUS = hearing pulse, bilat, no other sx's, so defer for now.   A.R. with ? RAD and will add singulair...no new c/o...back on it due to cough this winter.  --  COUGH = ? GERD b/c occurs with food and sisters had this as well; eval/tx per orders=PPI/H2 combo since no help PPI past 2 weeks and zyrtec/benadryl; ? MBSS if no help...at 3/18 OV=c/o pain/pressure in AM in chest and also at night; no dysphagia; rec PPI for a month and then H2; call if no benefit...had to go back on PPI as of 11/19 OV.  --  EOSINOPHILIA per clinic in [place] Immunologist; no true dx as of 10/20; has been on steroids a  year now and is on Prednisone 10 mg qd with 1 mg/mo taper; I reviewed records this OV; she has sxs of chest pressure/fatigue/dyspnea and that lets her know to not lower steroids further.  BILATERAL PNA 8/18=s/p Levaquin for 10 days per me; no temps, but pleuritic CP is coming back just a few days off abx and lisa when lying down at night;   ? CHF per CHEST CT and still with ORNELAS, so will get another CXR and labs; (BNP neg in [place] [facility]); consider ECHO to r/o effusion given CMG...get ECHO now just to be sure since sxs are lingering on---> recurrent 7/19 and has f/u CT flora next month as of 11/19 OV;   --  CHEST PAIN=under ribs on R, then radiated into back, and now wraps all the way around, worse at rest=better standing up; + pleuritic component; LGT, no cough per say, but hurts when she does; exam with ? decreased BS in bases, but no cords and no edema; needs CXR, CBC/d-dimer and call for results; if neg, ok to go to Chiropractor...is due to persistent effusion, so will eval for thoaracentesis; is on aleve for this as well---> s/p tap/bronch per Dr Peterson; ended up on steroids for a few months=off meds by Thanksgiving; still with mild dyspnea.  --  LBP with radiculoapthy on left with flims per chiropractor noted.  --  VIT D DEF and d/w (again) take 2K qd please...try 3K qd again...admits to non-compliance...28 = same issue...30.   BMD   --  MMG 8/5/19 per me.  COLON/EGD 7/18 = neg by report.  REFUSES SHOTS.  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    Follow Up   Return in about 1 year (around 6/19/2025).  Patient was given instructions and counseling regarding her condition or for health maintenance advice. Please see specific information pulled into the AVS if appropriate.

## 2024-06-21 ENCOUNTER — PATIENT MESSAGE (OUTPATIENT)
Dept: INTERNAL MEDICINE | Age: 64
End: 2024-06-21
Payer: MEDICARE

## 2024-06-21 ENCOUNTER — TELEPHONE (OUTPATIENT)
Dept: INTERNAL MEDICINE | Age: 64
End: 2024-06-21
Payer: MEDICARE

## 2024-06-21 RX ORDER — ESOMEPRAZOLE MAGNESIUM 40 MG/1
40 CAPSULE, DELAYED RELEASE ORAL
Qty: 90 CAPSULE | Refills: 1 | Status: CANCELLED | OUTPATIENT
Start: 2024-06-21

## 2024-06-21 RX ORDER — ESOMEPRAZOLE MAGNESIUM 40 MG/1
40 CAPSULE, DELAYED RELEASE ORAL
Qty: 90 CAPSULE | Refills: 1 | Status: SHIPPED | OUTPATIENT
Start: 2024-06-21

## 2024-06-21 NOTE — TELEPHONE ENCOUNTER
From: Melva Self  To: Baltazar Davis  Sent: 6/21/2024 9:52 AM EDT  Subject: Levothyroxine     Dr JOHNSON was sending in a new prescription for me to the mail order. But i got a text from [employer] instead. Would you please check on this for me. The mail order was considerably less expensive.   Thank you

## 2024-06-25 RX ORDER — LEVOTHYROXINE SODIUM 0.07 MG/1
75 TABLET ORAL DAILY
Qty: 90 TABLET | Refills: 3 | Status: SHIPPED | OUTPATIENT
Start: 2024-06-25

## 2024-08-07 ENCOUNTER — HOSPITAL ENCOUNTER (OUTPATIENT)
Dept: MAMMOGRAPHY | Facility: HOSPITAL | Age: 64
Discharge: HOME | End: 2024-08-07
Admitting: INTERNAL MEDICINE
Payer: MEDICARE

## 2024-08-07 ENCOUNTER — HOSPITAL ENCOUNTER (OUTPATIENT)
Dept: CT IMAGING | Facility: HOSPITAL | Age: 64
Discharge: HOME | End: 2024-08-07
Admitting: INTERNAL MEDICINE

## 2024-08-07 DIAGNOSIS — E78.2 MIXED HYPERLIPIDEMIA: ICD-10-CM

## 2024-08-07 DIAGNOSIS — Z12.31 BREAST CANCER SCREENING BY MAMMOGRAM: ICD-10-CM

## 2024-08-07 PROCEDURE — 75571 CT HRT W/O DYE W/CA TEST: CPT

## 2024-08-07 PROCEDURE — 77067 SCR MAMMO BI INCL CAD: CPT

## 2024-08-07 PROCEDURE — 77063 BREAST TOMOSYNTHESIS BI: CPT

## 2024-08-08 ENCOUNTER — TELEPHONE (OUTPATIENT)
Dept: INTERNAL MEDICINE | Age: 64
End: 2024-08-08
Payer: MEDICARE

## 2024-08-08 DIAGNOSIS — Z12.31 ENCOUNTER FOR SCREENING MAMMOGRAM FOR MALIGNANT NEOPLASM OF BREAST: Primary | ICD-10-CM

## 2024-08-08 NOTE — TELEPHONE ENCOUNTER
----- Message from Baltazar Davis sent at 8/7/2024  5:11 PM EDT -----  Please let patient know her screening mammogram is normal and please place an order for another next year.

## 2024-10-30 DIAGNOSIS — K44.9 HIATAL HERNIA: ICD-10-CM

## 2024-10-30 DIAGNOSIS — K21.9 GASTRO-ESOPHAGEAL REFLUX DISEASE WITHOUT ESOPHAGITIS: Primary | ICD-10-CM

## 2024-12-04 DIAGNOSIS — R10.9 ABDOMINAL PAIN, UNSPECIFIED ABDOMINAL LOCATION: Primary | ICD-10-CM

## 2024-12-06 ENCOUNTER — LAB (OUTPATIENT)
Facility: HOSPITAL | Age: 64
End: 2024-12-06
Payer: MEDICARE

## 2024-12-06 DIAGNOSIS — E03.4 HYPOTHYROIDISM DUE TO ACQUIRED ATROPHY OF THYROID: Primary | ICD-10-CM

## 2024-12-06 DIAGNOSIS — E03.4 HYPOTHYROIDISM DUE TO ACQUIRED ATROPHY OF THYROID: ICD-10-CM

## 2024-12-06 DIAGNOSIS — R10.9 ABDOMINAL PAIN, UNSPECIFIED ABDOMINAL LOCATION: ICD-10-CM

## 2024-12-06 LAB — TSH SERPL DL<=0.05 MIU/L-ACNC: 9.38 UIU/ML (ref 0.27–4.2)

## 2024-12-06 PROCEDURE — 84443 ASSAY THYROID STIM HORMONE: CPT

## 2024-12-06 PROCEDURE — 36415 COLL VENOUS BLD VENIPUNCTURE: CPT

## 2024-12-06 PROCEDURE — 82784 ASSAY IGA/IGD/IGG/IGM EACH: CPT

## 2024-12-06 PROCEDURE — 86231 EMA EACH IG CLASS: CPT

## 2024-12-06 PROCEDURE — 86364 TISS TRNSGLTMNASE EA IG CLAS: CPT

## 2024-12-06 RX ORDER — LEVOTHYROXINE SODIUM 88 UG/1
88 TABLET ORAL DAILY
Qty: 90 TABLET | Refills: 1 | Status: SHIPPED | OUTPATIENT
Start: 2024-12-06

## 2024-12-10 LAB
ENDOMYSIUM IGA SER QL: NEGATIVE
IGA SERPL-MCNC: 119 MG/DL (ref 87–352)
TTG IGA SER-ACNC: <2 U/ML (ref 0–3)

## 2024-12-23 ENCOUNTER — PATIENT MESSAGE (OUTPATIENT)
Dept: INTERNAL MEDICINE | Age: 64
End: 2024-12-23
Payer: MEDICARE

## 2024-12-23 DIAGNOSIS — R07.1 CHEST PAIN ON BREATHING: Primary | ICD-10-CM

## 2024-12-23 DIAGNOSIS — R89.8 EOSINOPHIL COUNT RAISED: ICD-10-CM

## 2024-12-26 ENCOUNTER — HOSPITAL ENCOUNTER (OUTPATIENT)
Facility: HOSPITAL | Age: 64
Discharge: HOME | End: 2024-12-26
Payer: MEDICARE

## 2024-12-26 ENCOUNTER — LAB (OUTPATIENT)
Facility: HOSPITAL | Age: 64
End: 2024-12-26
Payer: MEDICARE

## 2024-12-26 DIAGNOSIS — R07.1 CHEST PAIN ON BREATHING: ICD-10-CM

## 2024-12-26 DIAGNOSIS — R89.8 EOSINOPHIL COUNT RAISED: ICD-10-CM

## 2024-12-26 LAB
BASOPHILS # BLD AUTO: 0.02 10*3/MM3 (ref 0–0.2)
BASOPHILS NFR BLD AUTO: 0.4 % (ref 0–1.5)
DEPRECATED RDW RBC AUTO: 41.2 FL (ref 37–54)
EOSINOPHIL # BLD AUTO: 0 10*3/MM3 (ref 0–0.4)
EOSINOPHIL NFR BLD AUTO: 0 % (ref 0.3–6.2)
ERYTHROCYTE [DISTWIDTH] IN BLOOD BY AUTOMATED COUNT: 13.6 % (ref 12.3–15.4)
HCT VFR BLD AUTO: 42.3 % (ref 34–46.6)
HGB BLD-MCNC: 13.1 G/DL (ref 12–15.9)
IMM GRANULOCYTES # BLD AUTO: 0.01 10*3/MM3 (ref 0–0.05)
IMM GRANULOCYTES NFR BLD AUTO: 0.2 % (ref 0–0.5)
LYMPHOCYTES # BLD AUTO: 1.36 10*3/MM3 (ref 0.7–3.1)
LYMPHOCYTES NFR BLD AUTO: 27.9 % (ref 19.6–45.3)
MCH RBC QN AUTO: 25.7 PG (ref 26.6–33)
MCHC RBC AUTO-ENTMCNC: 31 G/DL (ref 31.5–35.7)
MCV RBC AUTO: 82.9 FL (ref 79–97)
MONOCYTES # BLD AUTO: 0.34 10*3/MM3 (ref 0.1–0.9)
MONOCYTES NFR BLD AUTO: 7 % (ref 5–12)
NEUTROPHILS NFR BLD AUTO: 3.14 10*3/MM3 (ref 1.7–7)
NEUTROPHILS NFR BLD AUTO: 64.5 % (ref 42.7–76)
NRBC BLD AUTO-RTO: 0 /100 WBC (ref 0–0.2)
PLATELET # BLD AUTO: 316 10*3/MM3 (ref 140–450)
PMV BLD AUTO: 10.6 FL (ref 6–12)
RBC # BLD AUTO: 5.1 10*6/MM3 (ref 3.77–5.28)
WBC NRBC COR # BLD AUTO: 4.87 10*3/MM3 (ref 3.4–10.8)

## 2024-12-26 PROCEDURE — 71046 X-RAY EXAM CHEST 2 VIEWS: CPT

## 2024-12-26 PROCEDURE — 36415 COLL VENOUS BLD VENIPUNCTURE: CPT

## 2024-12-26 PROCEDURE — 82785 ASSAY OF IGE: CPT

## 2024-12-26 PROCEDURE — 85025 COMPLETE CBC W/AUTO DIFF WBC: CPT

## 2024-12-26 NOTE — TELEPHONE ENCOUNTER
Spoke w/ pt stated she would like to have these done today as she is in town.    Orders pended for chest xr for right lung pain and CBC w/diff.    Could you double check these orders are correct?  Thank you

## 2024-12-28 LAB — IGE SERPL-ACNC: 59 IU/ML (ref 6–495)

## 2025-02-18 RX ORDER — ESOMEPRAZOLE MAGNESIUM 40 MG/1
40 CAPSULE, DELAYED RELEASE ORAL
Qty: 90 CAPSULE | Refills: 1 | Status: SHIPPED | OUTPATIENT
Start: 2025-02-18

## 2025-02-27 RX ORDER — ESOMEPRAZOLE MAGNESIUM 40 MG/1
40 CAPSULE, DELAYED RELEASE ORAL
Qty: 90 CAPSULE | Refills: 1 | OUTPATIENT
Start: 2025-02-27

## 2025-02-28 RX ORDER — LEVOTHYROXINE SODIUM 88 UG/1
88 TABLET ORAL DAILY
Qty: 90 TABLET | Refills: 1 | Status: SHIPPED | OUTPATIENT
Start: 2025-02-28

## 2025-03-05 RX ORDER — ESCITALOPRAM OXALATE 10 MG/1
10 TABLET ORAL DAILY
Qty: 90 TABLET | Refills: 1 | Status: SHIPPED | OUTPATIENT
Start: 2025-03-05

## 2025-03-17 RX ORDER — ESOMEPRAZOLE MAGNESIUM 40 MG/1
40 CAPSULE, DELAYED RELEASE ORAL
Qty: 90 CAPSULE | Refills: 1 | Status: SHIPPED | OUTPATIENT
Start: 2025-03-17

## 2025-03-17 RX ORDER — LEVOTHYROXINE SODIUM 88 UG/1
88 TABLET ORAL DAILY
Qty: 90 TABLET | Refills: 1 | Status: SHIPPED | OUTPATIENT
Start: 2025-03-17

## 2025-03-24 ENCOUNTER — TELEPHONE (OUTPATIENT)
Dept: GASTROENTEROLOGY | Facility: CLINIC | Age: 65
End: 2025-03-24

## 2025-03-24 NOTE — TELEPHONE ENCOUNTER
Attempted to contact Melva Self 1960 regarding the appointment no show with ADRYAN Ching on 3/24/25 @ 11:00 am  Patient is aware that there is a 24-hour cancellation policy and understands that a no-show letter will be mailed to them at the address on file.

## 2025-04-17 ENCOUNTER — CLINICAL SUPPORT (OUTPATIENT)
Dept: INTERNAL MEDICINE | Age: 65
End: 2025-04-17
Payer: MEDICARE

## 2025-04-17 ENCOUNTER — RESULTS FOLLOW-UP (OUTPATIENT)
Dept: INTERNAL MEDICINE | Age: 65
End: 2025-04-17

## 2025-04-17 DIAGNOSIS — Z00.00 MEDICARE ANNUAL WELLNESS VISIT, SUBSEQUENT: ICD-10-CM

## 2025-04-17 DIAGNOSIS — E78.2 MIXED HYPERLIPIDEMIA: ICD-10-CM

## 2025-04-17 DIAGNOSIS — E03.4 HYPOTHYROIDISM DUE TO ACQUIRED ATROPHY OF THYROID: ICD-10-CM

## 2025-04-17 DIAGNOSIS — E55.9 VITAMIN D DEFICIENCY: ICD-10-CM

## 2025-04-17 LAB
25(OH)D3 SERPL-MCNC: 31.5 NG/ML (ref 30–100)
ALBUMIN SERPL-MCNC: 4.2 G/DL (ref 3.5–5.2)
ALBUMIN/GLOB SERPL: 1.7 G/DL
ALP SERPL-CCNC: 106 U/L (ref 39–117)
ALT SERPL W P-5'-P-CCNC: 16 U/L (ref 1–33)
ANION GAP SERPL CALCULATED.3IONS-SCNC: 12.4 MMOL/L (ref 5–15)
AST SERPL-CCNC: 21 U/L (ref 1–32)
BASOPHILS # BLD AUTO: 0.01 10*3/MM3 (ref 0–0.2)
BASOPHILS NFR BLD AUTO: 0.3 % (ref 0–1.5)
BILIRUB SERPL-MCNC: 0.3 MG/DL (ref 0–1.2)
BUN SERPL-MCNC: 20 MG/DL (ref 8–23)
BUN/CREAT SERPL: 23 (ref 7–25)
CALCIUM SPEC-SCNC: 9.2 MG/DL (ref 8.6–10.5)
CHLORIDE SERPL-SCNC: 101 MMOL/L (ref 98–107)
CHOLEST SERPL-MCNC: 238 MG/DL (ref 0–200)
CO2 SERPL-SCNC: 24.6 MMOL/L (ref 22–29)
CREAT SERPL-MCNC: 0.87 MG/DL (ref 0.57–1)
DEPRECATED RDW RBC AUTO: 38.8 FL (ref 37–54)
EGFRCR SERPLBLD CKD-EPI 2021: 73.1 ML/MIN/1.73
EOSINOPHIL # BLD AUTO: 0 10*3/MM3 (ref 0–0.4)
EOSINOPHIL NFR BLD AUTO: 0 % (ref 0.3–6.2)
ERYTHROCYTE [DISTWIDTH] IN BLOOD BY AUTOMATED COUNT: 13.4 % (ref 12.3–15.4)
GLOBULIN UR ELPH-MCNC: 2.5 GM/DL
GLUCOSE SERPL-MCNC: 94 MG/DL (ref 65–99)
HBA1C MFR BLD: 5.6 % (ref 4.8–5.6)
HCT VFR BLD AUTO: 42.9 % (ref 34–46.6)
HDLC SERPL-MCNC: 64 MG/DL (ref 40–60)
HGB BLD-MCNC: 13.9 G/DL (ref 12–15.9)
IMM GRANULOCYTES # BLD AUTO: 0.01 10*3/MM3 (ref 0–0.05)
IMM GRANULOCYTES NFR BLD AUTO: 0.3 % (ref 0–0.5)
LDLC SERPL CALC-MCNC: 163 MG/DL (ref 0–100)
LDLC/HDLC SERPL: 2.52 {RATIO}
LYMPHOCYTES # BLD AUTO: 1.14 10*3/MM3 (ref 0.7–3.1)
LYMPHOCYTES NFR BLD AUTO: 29.9 % (ref 19.6–45.3)
MCH RBC QN AUTO: 25.8 PG (ref 26.6–33)
MCHC RBC AUTO-ENTMCNC: 32.4 G/DL (ref 31.5–35.7)
MCV RBC AUTO: 79.6 FL (ref 79–97)
MONOCYTES # BLD AUTO: 0.3 10*3/MM3 (ref 0.1–0.9)
MONOCYTES NFR BLD AUTO: 7.9 % (ref 5–12)
NEUTROPHILS NFR BLD AUTO: 2.35 10*3/MM3 (ref 1.7–7)
NEUTROPHILS NFR BLD AUTO: 61.6 % (ref 42.7–76)
NRBC BLD AUTO-RTO: 0 /100 WBC (ref 0–0.2)
PLATELET # BLD AUTO: 301 10*3/MM3 (ref 140–450)
PMV BLD AUTO: 10.3 FL (ref 6–12)
POTASSIUM SERPL-SCNC: 4.5 MMOL/L (ref 3.5–5.2)
PROT SERPL-MCNC: 6.7 G/DL (ref 6–8.5)
RBC # BLD AUTO: 5.39 10*6/MM3 (ref 3.77–5.28)
SODIUM SERPL-SCNC: 138 MMOL/L (ref 136–145)
T4 FREE SERPL-MCNC: 1.78 NG/DL (ref 0.92–1.68)
TRIGL SERPL-MCNC: 63 MG/DL (ref 0–150)
TSH SERPL DL<=0.05 MIU/L-ACNC: 0.1 UIU/ML (ref 0.27–4.2)
VLDLC SERPL-MCNC: 11 MG/DL (ref 5–40)
WBC NRBC COR # BLD AUTO: 3.81 10*3/MM3 (ref 3.4–10.8)

## 2025-04-17 PROCEDURE — 36415 COLL VENOUS BLD VENIPUNCTURE: CPT | Performed by: INTERNAL MEDICINE

## 2025-04-17 PROCEDURE — 84439 ASSAY OF FREE THYROXINE: CPT | Performed by: INTERNAL MEDICINE

## 2025-04-17 PROCEDURE — 84443 ASSAY THYROID STIM HORMONE: CPT | Performed by: INTERNAL MEDICINE

## 2025-04-17 PROCEDURE — 80061 LIPID PANEL: CPT | Performed by: INTERNAL MEDICINE

## 2025-04-17 PROCEDURE — 83036 HEMOGLOBIN GLYCOSYLATED A1C: CPT | Performed by: INTERNAL MEDICINE

## 2025-04-17 PROCEDURE — 82306 VITAMIN D 25 HYDROXY: CPT | Performed by: INTERNAL MEDICINE

## 2025-04-17 PROCEDURE — 80053 COMPREHEN METABOLIC PANEL: CPT | Performed by: INTERNAL MEDICINE

## 2025-04-17 PROCEDURE — 85025 COMPLETE CBC W/AUTO DIFF WBC: CPT | Performed by: INTERNAL MEDICINE

## 2025-04-23 ENCOUNTER — OFFICE VISIT (OUTPATIENT)
Age: 65
End: 2025-04-23
Payer: MEDICARE

## 2025-04-23 VITALS
WEIGHT: 149 LBS | HEIGHT: 63 IN | OXYGEN SATURATION: 98 % | BODY MASS INDEX: 26.4 KG/M2 | HEART RATE: 64 BPM | DIASTOLIC BLOOD PRESSURE: 78 MMHG | SYSTOLIC BLOOD PRESSURE: 128 MMHG

## 2025-04-23 DIAGNOSIS — M30.1 EOSINOPHILIC GRANULOMATOSIS WITH POLYANGIITIS (EGPA): ICD-10-CM

## 2025-04-23 DIAGNOSIS — E03.4 HYPOTHYROIDISM DUE TO ACQUIRED ATROPHY OF THYROID: Primary | ICD-10-CM

## 2025-04-23 DIAGNOSIS — E55.9 VITAMIN D DEFICIENCY: ICD-10-CM

## 2025-04-23 DIAGNOSIS — R73.01 IMPAIRED FASTING GLUCOSE: ICD-10-CM

## 2025-04-23 DIAGNOSIS — K21.9 GASTRO-ESOPHAGEAL REFLUX DISEASE WITHOUT ESOPHAGITIS: ICD-10-CM

## 2025-04-23 DIAGNOSIS — D72.18 EOSINOPHILIC GRANULOMATOSIS WITH POLYANGIITIS (EGPA): ICD-10-CM

## 2025-04-23 RX ORDER — LEVOTHYROXINE SODIUM 75 UG/1
75 TABLET ORAL
COMMUNITY

## 2025-04-23 NOTE — PROGRESS NOTES
"Chief Complaint  Follow-up (Pt is here to go over labs. ) and GI issues (Pt states that she went of Gluten last year to try to loose weight, her thyroid became over active. She went back on Gluten and got major heart burn and gas bubbles, she was tested for Celiac and was negative, she went off the Gluten again.)    Subjective      Melva Self presents to [facility] MEDICINE    History of present illness:  Patient pleasant 65-year-old female with underlying hypothyroidism, untreated hyperlipidemia, chronic leukopenia, among others, recently diagnosed with EGPA followed by specialist, who is coming in 6/23 for annual exam.  We reviewed her med list in detail, have arranged for comprehensive laboratory studies, and made recommendations in regard to some screening studies.  ---> Patient being seen early in 4/25 for abnormal labs and GI issues.    Review of Systems   Constitutional:  Negative for appetite change, chills, diaphoresis, fatigue and fever.   HENT:  Negative for congestion, ear pain, sore throat and swollen glands.    Eyes:  Negative for blurred vision.   Respiratory:  Negative for cough, chest tightness, shortness of breath and wheezing.    Cardiovascular:  Negative for chest pain, palpitations and leg swelling.   Gastrointestinal:  Negative for abdominal pain, nausea and vomiting.   Genitourinary:  Negative for difficulty urinating, dysuria and hematuria.   Musculoskeletal:  Negative for arthralgias, gait problem, myalgias and neck pain.   Skin:  Negative for rash and skin lesions.   Neurological:  Negative for syncope, weakness, numbness, memory problem and confusion.   Psychiatric/Behavioral:  Negative for self-injury and depressed mood.        Objective   Vital Signs:   /78   Pulse 64   Ht 160 cm (62.99\")   Wt 67.6 kg (149 lb)   SpO2 98%   BMI 26.40 kg/m²           Physical Exam  Vitals and nursing note reviewed.   Constitutional:       General: She is not " in acute distress.     Appearance: Normal appearance. She is not toxic-appearing.   HENT:      Head: Atraumatic.      Right Ear: External ear normal.      Left Ear: External ear normal.      Nose: Nose normal.      Mouth/Throat:      Mouth: Mucous membranes are moist.   Eyes:      General:         Right eye: No discharge.         Left eye: No discharge.      Extraocular Movements: Extraocular movements intact.      Pupils: Pupils are equal, round, and reactive to light.   Neck:      Comments: No carotid bruits.  Cardiovascular:      Rate and Rhythm: Normal rate and regular rhythm.      Pulses: Normal pulses.      Heart sounds: Normal heart sounds. No murmur heard.     No gallop.      Comments: Heart tones normal, no ectopy, no S3.  Pulmonary:      Effort: Pulmonary effort is normal. No respiratory distress.      Breath sounds: No wheezing, rhonchi or rales.      Comments: Lung fields clear bilaterally, no bronchospasm.  Abdominal:      General: There is no distension.      Palpations: Abdomen is soft. There is no mass.      Tenderness: There is no abdominal tenderness. There is no guarding.   Musculoskeletal:         General: No swelling or tenderness.      Cervical back: No tenderness.      Right lower leg: No edema.      Left lower leg: No edema.      Comments: No edema.   Skin:     General: Skin is warm and dry.      Findings: No rash.   Neurological:      General: No focal deficit present.      Mental Status: She is alert and oriented to person, place, and time. Mental status is at baseline.      Motor: No weakness.      Gait: Gait normal.   Psychiatric:         Mood and Affect: Mood normal.         Thought Content: Thought content normal.          Result Review   The following data was reviewed by: Baltazar Davis MD on 04/01/2022:  [x] Laboratory  [] Microbiology  [] Radiology  [] EKG/telemetry  [] Cardiology/Vascular  [] Pathology  [x] Old records             Assessment and Plan   Diagnoses and all orders for  this visit:    1. Hypothyroidism due to acquired atrophy of thyroid (Primary)  Assessment & Plan:  TSH is down from 9 to 0.1 as of her 4/25 OV.  This was after increasing her from 75 to 88 mcg daily.    Her weight is fairly stable over this timeframe, maybe up 5 pounds max.  She relates her diet, particularly in relation to gluten, to affecting her thyroid levels.  When she was avoiding it, is when she had the TSH of 0.1, when she got back on it she shot up to 9, and she is avoiding it again presently, and we are looking at a 0.1 again.    Recommendation was to continue 88 mcg a day, but only take half a tablet 1 day a week, this would be an average of 82 mcg daily.  However it, she does have a bunch of the 75 mcg tablets, and is willing to take this daily, in light of the gluten issue likely being responsible for the TSH of 9, this is certainly reasonable.    Will need to check labs in a few months.      Orders:  -     TSH; Standing  -     TSH; Future    2. Gastro-esophageal reflux disease without esophagitis  Assessment & Plan:  Patient doing better this regards as of her 4/25 OV.  She is on moderate dose daily Nexium, she is off of gluten having less symptoms, discussed with patient eventually she could try taking it every other day.    Orders:  -     Comprehensive Metabolic Panel; Future    3. Impaired fasting glucose  Assessment & Plan:  Patient's fasting sugars are less than 100, but we did randomly check her A1c, it was 5.6, so at least for a while we will keep an eye on this going forward.    Orders:  -     Hemoglobin A1c; Future    4. Vitamin D deficiency  Assessment & Plan:  Vitamin D stable at 32 on low-dose over-the-counter supplement as of 4/25, continue same.      5. Eosinophilic granulomatosis with polyangiitis (EGPA)  Overview:  Patient is being followed by Dr. Silver with the [facility].    She is recently been started on Nucala per his direction.    Orders:  -     CBC w AUTO  Differential; Future      --   --  OLDER NOTES:   ANNUAL PHYSICAL 10/20 = no ischemia with run=only able to walk as of 10/20 OV; d/w all labs in detail.  --  HYPOTHYROIDISM was on 1/2 of 125 qd, so try 75 qd now...only take 1/2 pill once a week=70 mcg qd...try 75 qd and call...tried to increase to 88, but felt anxious, took 1/2 pill once a week, but TSH low, so try 1/2 2x/wk = 75 dose again...stable...agree with 88 qd given TSH 4.6...0.3 already, so will take with food if sx's or lower RTO...3.5 with wt gain as of 3/18 OV, so rec repeat 3 mo and call...0.4 with no sxs 1/19...need labs 11/19---> THS 0.4 in 10/20 despite wt gain on the steroids.  --  LIPIDS stable w/o tx...ditto 11/14...bad 11/15 and will tx on RTO if same ballpark...no better with good thyroid, so tx now 4/16 (FH of same)... on it, but stopped due to myalgias and is rubbing lemon oil on feet?... apparently off med past 6 mo and still rubbing oil on feet...187 as of 3/18 OV is off the oil, but is also with wt gain and bump in TSH; not interested in meds...177 but failed med as above---> defer for now 10/20.  --  LEUKOPENIA remains stable, but Lymph % is trending up, was 31-37% 5 yrs ago, so Heme to eval again if no better RTO...it's better, so defer...3.4...2.6 is her nati, but all lines nl %, so repeat in 3-4 mo and to Heme then if same...better 5/19 and ok 7/19 when was sick; defer consult---> 7.3 on steroids.  FE DEF ANEMIA is stable as well on OTC iron...neg 8/16...12.9---> 14 in 10/20.  --  DEPRESSION and d/w [place] of action SSRI and will call...intolerant and no help low dose at least; he has sex addicition, she is going to see Jessica Castro... her due to non-compliance with f/u; on lexapro and no new issues--->stable as of 10/20 OV.  --  TINNITUS = hearing pulse, bilat, no other sx's, so defer for now.   A.R. with ? RAD and will add singulair...no new c/o...back on it due to cough this winter.  --  COUGH = ? GERD b/c  occurs with food and sisters had this as well; eval/tx per orders=PPI/H2 combo since no help PPI past 2 weeks and zyrtec/benadryl; ? MBSS if no help...at 3/18 OV=c/o pain/pressure in AM in chest and also at night; no dysphagia; rec PPI for a month and then H2; call if no benefit...had to go back on PPI as of 11/19 OV.  --  EOSINOPHILIA per clinic in [place] Immunologist; no true dx as of 10/20; has been on steroids a year now and is on Prednisone 10 mg qd with 1 mg/mo taper; I reviewed records this OV; she has sxs of chest pressure/fatigue/dyspnea and that lets her know to not lower steroids further.  BILATERAL PNA 8/18=s/p Levaquin for 10 days per me; no temps, but pleuritic CP is coming back just a few days off abx and lisa when lying down at night;   ? CHF per CHEST CT and still with ORNELAS, so will get another CXR and labs; (BNP neg in [place] [facility]); consider ECHO to r/o effusion given CMG...get ECHO now just to be sure since sxs are lingering on---> recurrent 7/19 and has f/u CT flora next month as of 11/19 OV;   --  CHEST PAIN=under ribs on R, then radiated into back, and now wraps all the way around, worse at rest=better standing up; + pleuritic component; LGT, no cough per say, but hurts when she does; exam with ? decreased BS in bases, but no cords and no edema; needs CXR, CBC/d-dimer and call for results; if neg, ok to go to Chiropractor...is due to persistent effusion, so will eval for thoaracentesis; is on aleve for this as well---> s/p tap/bronch per Dr Peterson; ended up on steroids for a few months=off meds by Thanksgiving; still with mild dyspnea.  --  LBP with radiculoapthy on left with flims per chiropractor noted.  --  VIT D DEF and d/w (again) take 2K qd please...try 3K qd again...admits to non-compliance...28 = same issue...30.   BMD   --  MMG 8/5/19 per me.  COLON/EGD 7/18 = neg by report.  REFUSES SHOTS.  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    Follow Up   Return in about 6 months (around 10/23/2025).  Patient was given instructions and counseling regarding her condition or for health maintenance advice. Please see specific information pulled into the AVS if appropriate.

## 2025-04-23 NOTE — ASSESSMENT & PLAN NOTE
TSH is down from 9 to 0.1 as of her 4/25 OV.  This was after increasing her from 75 to 88 mcg daily.    Her weight is fairly stable over this timeframe, maybe up 5 pounds max.  She relates her diet, particularly in relation to gluten, to affecting her thyroid levels.  When she was avoiding it, is when she had the TSH of 0.1, when she got back on it she shot up to 9, and she is avoiding it again presently, and we are looking at a 0.1 again.    Recommendation was to continue 88 mcg a day, but only take half a tablet 1 day a week, this would be an average of 82 mcg daily.  However it, she does have a bunch of the 75 mcg tablets, and is willing to take this daily, in light of the gluten issue likely being responsible for the TSH of 9, this is certainly reasonable.    Will need to check labs in a few months.

## 2025-04-23 NOTE — ASSESSMENT & PLAN NOTE
Patient doing better this regards as of her 4/25 OV.  She is on moderate dose daily Nexium, she is off of gluten having less symptoms, discussed with patient eventually she could try taking it every other day.

## 2025-04-23 NOTE — ASSESSMENT & PLAN NOTE
Patient's fasting sugars are less than 100, but we did randomly check her A1c, it was 5.6, so at least for a while we will keep an eye on this going forward.

## 2025-04-23 NOTE — ASSESSMENT & PLAN NOTE
Patient is LDL is stuck in the 160 ballpark as of her 4/25 OV.  She has been intolerant to statins previously, and now that we have the CT calcium score noted above, we are certainly not interested in treating unless she develops significant risk factors.

## 2025-05-12 ENCOUNTER — TRANSCRIBE ORDERS (OUTPATIENT)
Dept: ADMINISTRATIVE | Facility: HOSPITAL | Age: 65
End: 2025-05-12
Payer: MEDICARE

## 2025-05-12 DIAGNOSIS — M30.1 EOSINOPHILIC GRANULOMATOSIS WITH POLYANGIITIS (EGPA) WITH LUNG INVOLVEMENT: Primary | ICD-10-CM

## 2025-05-12 DIAGNOSIS — D72.18 EOSINOPHILIC GRANULOMATOSIS WITH POLYANGIITIS (EGPA) WITH LUNG INVOLVEMENT: Primary | ICD-10-CM

## 2025-07-17 ENCOUNTER — LAB (OUTPATIENT)
Dept: LAB | Facility: HOSPITAL | Age: 65
End: 2025-07-17
Payer: MEDICARE

## 2025-07-17 DIAGNOSIS — E03.4 HYPOTHYROIDISM DUE TO ACQUIRED ATROPHY OF THYROID: ICD-10-CM

## 2025-07-17 LAB — TSH SERPL DL<=0.05 MIU/L-ACNC: 0.99 UIU/ML (ref 0.27–4.2)

## 2025-07-17 PROCEDURE — 36415 COLL VENOUS BLD VENIPUNCTURE: CPT

## 2025-07-17 PROCEDURE — 84443 ASSAY THYROID STIM HORMONE: CPT

## 2025-07-30 RX ORDER — ESCITALOPRAM OXALATE 10 MG/1
10 TABLET ORAL DAILY
Qty: 90 TABLET | Refills: 3 | Status: SHIPPED | OUTPATIENT
Start: 2025-07-30

## 2025-08-11 RX ORDER — LEVOTHYROXINE SODIUM 88 UG/1
88 TABLET ORAL DAILY
Qty: 90 TABLET | Refills: 3 | OUTPATIENT
Start: 2025-08-11

## 2025-08-11 RX ORDER — ESOMEPRAZOLE MAGNESIUM 40 MG/1
40 CAPSULE, DELAYED RELEASE ORAL
Qty: 90 CAPSULE | Refills: 3 | Status: SHIPPED | OUTPATIENT
Start: 2025-08-11

## 2025-08-15 ENCOUNTER — PATIENT MESSAGE (OUTPATIENT)
Age: 65
End: 2025-08-15
Payer: MEDICARE

## 2025-08-15 RX ORDER — LEVOTHYROXINE SODIUM 75 UG/1
75 TABLET ORAL
Qty: 30 TABLET | Refills: 1 | Status: SHIPPED | OUTPATIENT
Start: 2025-08-15 | End: 2025-08-18 | Stop reason: SDUPTHER

## 2025-08-15 RX ORDER — LEVOTHYROXINE SODIUM 75 UG/1
75 TABLET ORAL
Qty: 90 TABLET | Refills: 1 | Status: SHIPPED | OUTPATIENT
Start: 2025-08-15 | End: 2025-08-15 | Stop reason: SDUPTHER

## 2025-08-18 RX ORDER — LEVOTHYROXINE SODIUM 75 UG/1
75 TABLET ORAL
Qty: 90 TABLET | Refills: 3 | Status: SHIPPED | OUTPATIENT
Start: 2025-08-18 | End: 2025-08-20

## 2025-08-20 ENCOUNTER — TELEPHONE (OUTPATIENT)
Age: 65
End: 2025-08-20
Payer: MEDICARE

## 2025-08-20 RX ORDER — LEVOTHYROXINE SODIUM 75 MCG
75 TABLET ORAL
Qty: 90 TABLET | Refills: 1 | Status: SHIPPED | OUTPATIENT
Start: 2025-08-20